# Patient Record
Sex: FEMALE | Race: WHITE | NOT HISPANIC OR LATINO | Employment: OTHER | URBAN - METROPOLITAN AREA
[De-identification: names, ages, dates, MRNs, and addresses within clinical notes are randomized per-mention and may not be internally consistent; named-entity substitution may affect disease eponyms.]

---

## 2017-01-18 ENCOUNTER — OFFICE VISIT (OUTPATIENT)
Dept: PAIN MEDICINE | Facility: CLINIC | Age: 52
End: 2017-01-18
Payer: MEDICARE

## 2017-01-18 VITALS
BODY MASS INDEX: 22.2 KG/M2 | HEART RATE: 70 BPM | DIASTOLIC BLOOD PRESSURE: 78 MMHG | HEIGHT: 64 IN | SYSTOLIC BLOOD PRESSURE: 121 MMHG | TEMPERATURE: 98 F | WEIGHT: 130.06 LBS

## 2017-01-18 DIAGNOSIS — M79.10 MYALGIA: ICD-10-CM

## 2017-01-18 DIAGNOSIS — M47.816 SPONDYLOSIS OF LUMBAR REGION WITHOUT MYELOPATHY OR RADICULOPATHY: ICD-10-CM

## 2017-01-18 DIAGNOSIS — M48.02 NEURAL FORAMINAL STENOSIS OF CERVICAL SPINE: ICD-10-CM

## 2017-01-18 DIAGNOSIS — M47.816 LUMBAR FACET ARTHROPATHY: ICD-10-CM

## 2017-01-18 DIAGNOSIS — M47.812 FACET ARTHRITIS OF CERVICAL REGION: ICD-10-CM

## 2017-01-18 DIAGNOSIS — M47.812 FACET JOINT DISEASE OF CERVICAL REGION: Primary | ICD-10-CM

## 2017-01-18 PROCEDURE — 99213 OFFICE O/P EST LOW 20 MIN: CPT | Mod: S$PBB,,, | Performed by: NURSE PRACTITIONER

## 2017-01-18 PROCEDURE — 99213 OFFICE O/P EST LOW 20 MIN: CPT | Mod: PBBFAC | Performed by: NURSE PRACTITIONER

## 2017-01-18 PROCEDURE — 99999 PR PBB SHADOW E&M-EST. PATIENT-LVL III: CPT | Mod: PBBFAC,,, | Performed by: NURSE PRACTITIONER

## 2017-01-18 NOTE — MR AVS SNAPSHOT
[facility]  [address]  [place] [address]  Phone: [phone]  Fax: [phone]                  [place]   2017 8:20 AM   Office Visit    Description:  Female : 1965   Provider:  RADHA Salcido   Department:  [facility]           Reason for Visit     Headache     Shoulder Pain           Diagnoses this Visit        Comments    Facet joint disease of cervical region    -  Primary     Facet arthritis of cervical region         Myalgia         Neural foraminal stenosis of cervical spine         Lumbar facet arthropathy         Spondylosis of lumbar region without myelopathy or radiculopathy                To Do List           Your Future Surgeries/Procedures     2017   Surgery with Richmond Zafar MD   [facility] ([facility])    [address] [facility] [address] [phone]              Goals (5 Years of Data)     None      [facility]     [facility] -  Assistance  Registered nurses in the [facility] provide clinical advisement, health education, appointment booking, and other advisory services.  Call for this free service at [phone].             Medications           Message regarding Medications     Verify the changes and/or additions to your medication regime listed below are the same as discussed with your clinician today.  If any of these changes or additions are incorrect, please notify your healthcare provider.             Verify that the below list of medications is an accurate representation of the medications you are currently taking.  If none reported, the list may be blank. If incorrect, please contact your healthcare provider. Carry this list with you in case of emergency.           Current Medications     alprazolam (XANAX) 1 MG tablet     amitriptyline (ELAVIL) 10 MG tablet Take 10 mg by mouth Daily.    calcium-vitamin D3  "500 mg(1,250mg) -200 unit per tablet Take 1 tablet by mouth 2 (two) times daily with meals.    CRESTOR 10 mg tablet     cyanocobalamin, vitamin B-12, (VITAMELTS ENERGY) 1,500 mcg TbDL Take by mouth.    cyclobenzaprine (FLEXERIL) 5 MG tablet Take 5 mg by mouth 3 (three) times daily as needed for Muscle spasms.    DOC-Q-LACE 100 mg capsule     fish,bora,flax oils-om3,6,9 #1 (TRIPLE OMEGA 3-6-9) 400-400-400 mg Cap Take by mouth.    infliximab (REMICADE) 100 mg injection Inject 100 mg into the vein.    Lacto gasseri-B bifid-B longum 1.5 billion cell Cap Take 1 capsule by mouth once daily. Lara colon health probiotic    lubiprostone (AMITIZA) 24 MCG Cap Take 24 mcg by mouth 2 (two) times daily.    mesalamine (APRISO) 0.375 gram Cp24 Take 0.375 g by mouth once daily.    mometasone (NASONEX) 50 mcg/actuation nasal spray SHAKE LQ AND U 2 SPRAYS IEN QD    multivitamin capsule Take 1 capsule by mouth once daily.    polyethylene glycol (GLYCOLAX) 17 gram/dose powder     PROAIR HFA 90 mcg/actuation inhaler     promethazine (PHENERGAN) 25 MG tablet     rabeprazole (ACIPHEX) 20 mg tablet TK 1 T PO QD    UBIDECARENONE (COENZYME Q10) 60 mg Cap Take 100 mg by mouth once daily.           Clinical Reference Information           Vital Signs - Last Recorded  Most recent update: 1/18/2017  8:51 AM by Chely Marcum LPN    BP Pulse Temp Ht Wt LMP    121/78 70 97.8 °F (36.6 °C) (Oral) 5' 4" (1.626 m) 59 kg (130 lb 1.1 oz) 07/11/2013    BMI                22.33 kg/m2          Blood Pressure          Most Recent Value    BP  121/78      Allergies as of 1/18/2017     Ciprofloxacin    Naproxen    Tramadol      Immunizations Administered on Date of Encounter - 1/18/2017     None      "
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
## 2017-02-01 ENCOUNTER — HOSPITAL ENCOUNTER (OUTPATIENT)
Facility: OTHER | Age: 52
Discharge: HOME | End: 2017-02-01
Attending: ANESTHESIOLOGY | Admitting: ANESTHESIOLOGY
Payer: MEDICARE

## 2017-02-01 ENCOUNTER — SURGERY (OUTPATIENT)
Age: 52
End: 2017-02-01

## 2017-02-01 VITALS
OXYGEN SATURATION: 99 % | DIASTOLIC BLOOD PRESSURE: 67 MMHG | TEMPERATURE: 98 F | SYSTOLIC BLOOD PRESSURE: 109 MMHG | BODY MASS INDEX: 21.68 KG/M2 | HEART RATE: 62 BPM | HEIGHT: 64 IN | WEIGHT: 127 LBS | RESPIRATION RATE: 16 BRPM

## 2017-02-01 DIAGNOSIS — M47.812 FACET ARTHRITIS OF CERVICAL REGION: Primary | ICD-10-CM

## 2017-02-01 PROCEDURE — 25000003 PHARM REV CODE 250: Performed by: ANESTHESIOLOGY

## 2017-02-01 PROCEDURE — 64492 INJ PARAVERT F JNT C/T 3 LEV: CPT | Mod: 50 | Performed by: ANESTHESIOLOGY

## 2017-02-01 PROCEDURE — 64490 INJ PARAVERT F JNT C/T 1 LEV: CPT | Mod: 50 | Performed by: ANESTHESIOLOGY

## 2017-02-01 PROCEDURE — 64492 INJ PARAVERT F JNT C/T 3 LEV: CPT | Mod: 50,,, | Performed by: ANESTHESIOLOGY

## 2017-02-01 PROCEDURE — 64491 INJ PARAVERT F JNT C/T 2 LEV: CPT | Mod: 50 | Performed by: ANESTHESIOLOGY

## 2017-02-01 PROCEDURE — 64490 INJ PARAVERT F JNT C/T 1 LEV: CPT | Mod: 50,,, | Performed by: ANESTHESIOLOGY

## 2017-02-01 PROCEDURE — 64491 INJ PARAVERT F JNT C/T 2 LEV: CPT | Mod: 50,,, | Performed by: ANESTHESIOLOGY

## 2017-02-01 RX ORDER — BUPIVACAINE HYDROCHLORIDE 5 MG/ML
3 INJECTION, SOLUTION PERINEURAL ONCE
Status: DISCONTINUED | OUTPATIENT
Start: 2017-02-01 | End: 2017-02-01 | Stop reason: HOSPADM

## 2017-02-01 RX ORDER — BUPIVACAINE HYDROCHLORIDE 5 MG/ML
INJECTION, SOLUTION EPIDURAL; INTRACAUDAL
Status: DISCONTINUED | OUTPATIENT
Start: 2017-02-01 | End: 2017-02-01 | Stop reason: HOSPADM

## 2017-02-01 RX ORDER — LIDOCAINE HYDROCHLORIDE 10 MG/ML
10 INJECTION INFILTRATION; PERINEURAL
Status: COMPLETED | OUTPATIENT
Start: 2017-02-01 | End: 2017-02-01

## 2017-02-01 RX ADMIN — BUPIVACAINE HYDROCHLORIDE 10 ML: 5 INJECTION, SOLUTION EPIDURAL; INTRACAUDAL; PERINEURAL at 02:02

## 2017-02-01 RX ADMIN — LIDOCAINE HYDROCHLORIDE 10 ML: 10 INJECTION, SOLUTION INFILTRATION; PERINEURAL at 02:02
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
## 2017-02-01 NOTE — OP NOTE
cERVICAL Medial Branch Block Under Fluoroscopy  Time-out taken to identify patient and procedure side prior to starting the procedure.        Date of Procedure: 02/01/2017                                                             PROCEDURE:  Bilateral medial branch block at the transverse processes of C3, C4, C5    REASON FOR PROCEDURE:  Facet arthropathy    PHYSICIAN: Richmond Zafar MD  ASSISTANTS: None    MEDICATIONS INJECTED:  0.5% Bupivicaine total 3mL  LOCAL ANESTHETIC USED:   Xylocaine 1% 1mL / site    SEDATION MEDICATIONS: None    ESTIMATED BLOOD LOSS:  None.    COMPLICATIONS:  None.    TECHNIQUE:   Laying in a prone position, the patient was prepped and draped in the usual sterile fashion using ChloraPrep and fenestrated drape.  The level was determined under fluoroscopic guidance.  Local anesthetic was given by going down to the hub of the 27-gauge 1.25in needle and raising a wheel.  A 22-gauge 3.5inch needle was introduced to the anatomic local of the medial branch at each of the above levels using fluoroscopy. Then after negative aspiration, the medication was injected slowly. The patient tolerated the procedure well.       The patient was monitored after the procedure.  Patient was given post procedure and discharge instructions to follow at home.  We will see the patient back in two weeks or the patient may call to inform of status. The patient was discharged in a stable condition

## 2017-02-01 NOTE — INTERVAL H&P NOTE
The patient has been examined and the H&P has been reviewed:    I concur with the findings and no changes have occurred since H&P was written.    Anesthesia/Surgery risks, benefits and alternative options discussed and understood by patient/family.      HPI  Presents today for cervical MBB @ C3,4,5 bilaterally. No new complaints or medical problems. Pt experiencing some congestion now related to allergies. No fevers, chills, or antibiotics.    PMHx, PSHx, Allergies, Medications reviewed in epic    ROS negative except pain complaints in HPI    OBJECTIVE:    Visit Vitals    LMP 07/11/2013       PHYSICAL EXAMINATION:    GENERAL: Well appearing, in no acute distress, alert and oriented x3.  PSYCH:  Mood and affect appropriate.  SKIN: Skin color, texture, turgor normal, no rashes or lesions.  CV: RRR with palpation of the radial artery.  PULM: No evidence of respiratory difficulty, symmetric chest rise. Clear to auscultation.  NEURO: Cranial nerves grossly intact.    Plan:    Proceed with procedure as planned    Donavan Darby  02/01/2017      There are no hospital problems to display for this patient.

## 2017-02-01 NOTE — DISCHARGE INSTRUCTIONS

## 2017-02-01 NOTE — DISCHARGE SUMMARY
Discharge Note  Short Stay      SUMMARY     Admit Date: 2/1/2017    Attending Physician: Richmond Zafar      Discharge Physician: Richmond Zafar      Discharge Date: 2/1/2017 3:03 PM     PROCEDURE:  Bilateral medial branch block at the transverse processes of C3, C4, C5    REASON FOR PROCEDURE:  Facet arthropathy    Disposition: Home or self care    Patient Instructions:   Current Discharge Medication List      CONTINUE these medications which have NOT CHANGED    Details   alprazolam (XANAX) 1 MG tablet Refills: 2      amitriptyline (ELAVIL) 10 MG tablet Take 10 mg by mouth Daily.  Refills: 2      calcium-vitamin D3 500 mg(1,250mg) -200 unit per tablet Take 1 tablet by mouth 2 (two) times daily with meals.      CRESTOR 10 mg tablet       cyanocobalamin, vitamin B-12, (VITAMELTS ENERGY) 1,500 mcg TbDL Take by mouth.      fish,bora,flax oils-om3,6,9 #1 (TRIPLE OMEGA 3-6-9) 400-400-400 mg Cap Take by mouth.      infliximab (REMICADE) 100 mg injection Inject 100 mg into the vein.      lubiprostone (AMITIZA) 24 MCG Cap Take 24 mcg by mouth 2 (two) times daily.      mesalamine (APRISO) 0.375 gram Cp24 Take 0.375 g by mouth once daily.      mometasone (NASONEX) 50 mcg/actuation nasal spray SHAKE LQ AND U 2 SPRAYS IEN QD  Refills: 2      multivitamin capsule Take 1 capsule by mouth once daily.      rabeprazole (ACIPHEX) 20 mg tablet TK 1 T PO QD  Refills: 11      UBIDECARENONE (COENZYME Q10) 60 mg Cap Take 100 mg by mouth once daily.      cyclobenzaprine (FLEXERIL) 5 MG tablet Take 5 mg by mouth 3 (three) times daily as needed for Muscle spasms.      DOC-Q-LACE 100 mg capsule Refills: 3      Lacto gasseri-B bifid-B longum 1.5 billion cell Cap Take 1 capsule by mouth once daily. Lara colon health probiotic      polyethylene glycol (GLYCOLAX) 17 gram/dose powder       PROAIR HFA 90 mcg/actuation inhaler       promethazine (PHENERGAN) 25 MG tablet Refills: 0             Resume home diet and activity

## 2017-02-01 NOTE — IP AVS SNAPSHOT
[facility] Location ([facility])  [address] [place], [place] [id number]  Phone: [phone]           Patient Discharge Instructions     Our goal is to set you up for success. This packet includes information on your condition, medications, and your home care. It will help you to care for yourself so you don't get sicker and need to go back to the hospital.     Please ask your nurse if you have any questions.        There are many details to remember when preparing to leave the hospital. Here is what you will need to do:    1. Take your medicine. If you are prescribed medications, review your Medication List in the following pages. You may have new medications to  at the pharmacy and others that you'll need to stop taking. Review the instructions for how and when to take your medications. Talk with your doctor or nurses if you are unsure of what to do.     2. Go to your follow-up appointments. Specific follow-up information is listed in the following pages. Your may be contacted by a transition nurse or clinical provider about future appointments. Be sure we have all of the phone numbers to reach you, if needed. Please contact your provider's office if you are unable to make an appointment.     3. Watch for warning signs. Your doctor or nurse will give you detailed warning signs to watch for and when to call for assistance. These instructions may also include educational information about your condition. If you experience any of warning signs to your health, call your doctor.               [facility]  Unless otherwise directed by your provider, please contact [facility], our nurse care line that is available for 24/7 assistance.     [phone] (toll-free)    Registered nurses in the [facility] provide clinical advisement, health education, appointment booking, and other advisory services.                    ** Verify the list of medication(s) below is accurate and up to  date. Carry this with you in case of emergency. If your medications have changed, please notify your healthcare provider.             Medication List      CONTINUE taking these medications        Additional Info                      alprazolam 1 MG tablet   Commonly known as:  XANAX   Refills:  2      Begin Date    AM    Noon    PM    Bedtime       amitriptyline 10 MG tablet   Commonly known as:  ELAVIL   Refills:  2   Dose:  10 mg    Instructions:  Take 10 mg by mouth Daily.     Begin Date    AM    Noon    PM    Bedtime       calcium-vitamin D3 500 mg(1,250mg) -200 unit per tablet   Refills:  0   Dose:  1 tablet    Instructions:  Take 1 tablet by mouth 2 (two) times daily with meals.     Begin Date    AM    Noon    PM    Bedtime       coenzyme Q10 60 mg Cap   Refills:  0   Dose:  100 mg    Instructions:  Take 100 mg by mouth once daily.     Begin Date    AM    Noon    PM    Bedtime       CRESTOR 10 MG tablet   Refills:  0   Generic drug:  rosuvastatin      Begin Date    AM    Noon    PM    Bedtime       cyclobenzaprine 5 MG tablet   Commonly known as:  FLEXERIL   Refills:  0   Dose:  5 mg    Instructions:  Take 5 mg by mouth 3 (three) times daily as needed for Muscle spasms.     Begin Date    AM    Noon    PM    Bedtime       DOC-Q-LACE 100 MG capsule   Refills:  3   Generic drug:  docusate sodium      Begin Date    AM    Noon    PM    Bedtime       infliximab 100 mg injection   Commonly known as:  REMICADE   Refills:  0   Dose:  100 mg    Instructions:  Inject 100 mg into the vein.     Begin Date    AM    Noon    PM    Bedtime       L. gasseri-B. bifidum-B longum 1.5 billion cell Cap   Refills:  0   Dose:  1 capsule    Instructions:  Take 1 capsule by mouth once daily. Lara colon health probiotic     Begin Date    AM    Noon    PM    Bedtime       lubiprostone 24 MCG Cap   Commonly known as:  AMITIZA   Refills:  0   Dose:  24 mcg    Instructions:  Take 24 mcg by mouth 2 (two) times daily.     Begin Date    AM     Noon    PM    Bedtime       mesalamine 0.375 gram Cp24   Commonly known as:  APRISO   Refills:  0   Dose:  0.375 g    Instructions:  Take 0.375 g by mouth once daily.     Begin Date    AM    Noon    PM    Bedtime       mometasone 50 mcg/actuation nasal spray   Commonly known as:  NASONEX   Refills:  2    Instructions:  SHAKE LQ AND U 2 SPRAYS IEN QD     Begin Date    AM    Noon    PM    Bedtime       multivitamin capsule   Refills:  0   Dose:  1 capsule    Instructions:  Take 1 capsule by mouth once daily.     Begin Date    AM    Noon    PM    Bedtime       polyethylene glycol 17 gram/dose powder   Commonly known as:  GLYCOLAX   Refills:  0      Begin Date    AM    Noon    PM    Bedtime       PROAIR HFA 90 mcg/actuation inhaler   Refills:  0   Generic drug:  albuterol      Begin Date    AM    Noon    PM    Bedtime       promethazine 25 MG tablet   Commonly known as:  PHENERGAN   Refills:  0      Begin Date    AM    Noon    PM    Bedtime       rabeprazole 20 mg tablet   Commonly known as:  ACIPHEX   Refills:  11    Instructions:  TK 1 T PO QD     Begin Date    AM    Noon    PM    Bedtime       TRIPLE OMEGA 3-6-9 400-400-400 mg Cap   Refills:  0   Generic drug:  fish,bora,flax oils-om3,6,9 #1    Instructions:  Take by mouth.     Begin Date    AM    Noon    PM    Bedtime       VITAMELTS ENERGY 1,500 mcg Tbdl   Refills:  0   Generic drug:  cyanocobalamin (vitamin B-12)    Instructions:  Take by mouth.     Begin Date    AM    Noon    PM    Bedtime                  Please bring to all follow up appointments:    1. A copy of your discharge instructions.  2. All medicines you are currently taking in their original bottles.  3. Identification and insurance card.    Please arrive 15 minutes ahead of scheduled appointment time.    Please call 24 hours in advance if you must reschedule your appointment and/or time.        Your Scheduled Appointments     Apr 25, 2017  9:00 AM CDT   OCT with OPHTHALMOLOGY TESTING, RYAN  "  JITENDRA Narvaez - Ophthalmology ([facility]    [address] [facility] [address]   [phone]            Apr 25, 2017 10:00 AM CDT   Established Patient Visit with OPHTHALMOLOGY GENERALSOLANGEHIRA Solange - Ophthalmology ([facility]    [address] [facility] [address]   [phone]                  Discharge Instructions       Home Care Instructions Pain Management:    1. DIET:   You may resume your normal diet today.   2. BATHING:   You may shower with luke warm water.  3. DRESSING:   You may remove your bandage today.   4. ACTIVITY LEVEL:   You may resume your normal activities 24 hrs after your procedure.  5. MEDICATIONS:   You may resume your normal medications today.   6. SPECIAL INSTRUCTIONS:   No heat to the injection site for 24 hrs including, bath or shower, heating pad, moist heat, or hot tubs.    Use ice pack to injection site for any pain or discomfort.  Apply ice packs for 20 minute intervals as needed.   If you have received any sedatives by mouth today you may not drive for 12 hours.    If you have received any sedation through your IV, you may not drive for 24 hrs.     PLEASE CALL YOUR DOCTOR IF:  1. Redness or swelling around the injection site.  2. Fever of 101 degrees  3. Drainage (pus) from the injection site.  4. For any continuous bleeding (some dried blood over the incision is normal.)    FOR EMERGENCIES:   If any unusual problems or difficulties occur during clinic hours, call [phone] or 289.         Admission Information     Date & Time Provider Department [facility]    2/1/2017  1:29 PM Richmond Zafar MD [facility] [id number]      Care Providers     Provider Role Specialty Primary office phone    Richmond Zafar MD Attending Provider Pain Medicine [phone]    Richmond Zafar MD Surgeon  Pain Medicine [phone]      Your Vitals Were     BP Pulse Temp Resp Height Weight    109/67 62 98 °F (36.7 °C) (Oral) 16 5' 4" " (1.626 m) 57.6 kg (127 lb)    Last Period SpO2 BMI          07/11/2013 99% 21.8 kg/m2        Recent Lab Values     No lab values to display.      Allergies as of 2/1/2017        Reactions    Ciprofloxacin Swelling    Naproxen Swelling    Tramadol     Makes pt have stomach bloat feeling      Advance Directives     An advance directive is a document which, in the event you are no longer able to make decisions for yourself, tells your healthcare team what kind of treatment you do or do not want to receive, or who you would like to make those decisions for you.  If you do not currently have an advance directive, [facility] encourages you to create one.  For more information call:  [phone]), [phone] ([phone]),  or log on to [email or web address].        Smoking Cessation     If you would like to quit smoking:   You may be eligible for free services if you are a [place] resident and started smoking cigarettes before September 1, 1988.  Call the [employer]oking [employer]essation [employer] ([employer]) toll free at (185) 775-0996 or (168) 095-0753.   Call 3-108-QUIT-NOW if you do not meet the above criteria.            Language Assistance Services     ATTENTION: Language assistance services are available, free of charge. Please call [phone].      ATENCIÓN: Si habla español, tiene a vieyra disposición servicios gratuitos de asistencia lingüística. Llame al [phone].     CHÚ Ý: N?u b?n nói Ti?ng Vi?t, có các d?ch v? h? tr? ngôn ng? mi?n phí dành cho b?n. G?i s? [phone].         [facility] complies with applicable Federal civil rights laws and does not discriminate on the basis of race, color, national origin, age, disability, or sex.

## 2017-02-02 ENCOUNTER — TELEPHONE (OUTPATIENT)
Dept: PAIN MEDICINE | Facility: CLINIC | Age: 52
End: 2017-02-02

## 2017-02-02 NOTE — TELEPHONE ENCOUNTER
----- Message from Matthew Graves sent at 2/2/2017  2:22 PM CST -----  X_  1st Request  _  2nd Request  _  3rd Request        Who: Elba Rock    Why: Patient giving results of her pain diary. An hour after procedure- 95 percent relief with pressure, 5 pm yesterday- 90 percent relief with pressure, 6 pm yesterday- 75 percent relief with pressure on shoulder and shoulder blade, 7 pm yesterday- 73 percent with pressure, 8 pm- 70 percent relief, 9 pm- 65 percent relief, 8:30 am today- 75 percent. Currently- 65 percent    What Number to Call Back: [phone]    When to Expect a call back: (Before the end of the day)   -- if the call is after 12:00, the call back will be tomorrow.

## 2017-02-22 ENCOUNTER — HOSPITAL ENCOUNTER (OUTPATIENT)
Facility: OTHER | Age: 52
Discharge: HOME | End: 2017-02-22
Attending: ANESTHESIOLOGY | Admitting: ANESTHESIOLOGY
Payer: MEDICARE

## 2017-02-22 ENCOUNTER — SURGERY (OUTPATIENT)
Age: 52
End: 2017-02-22

## 2017-02-22 VITALS
TEMPERATURE: 98 F | HEART RATE: 69 BPM | OXYGEN SATURATION: 100 % | BODY MASS INDEX: 21.34 KG/M2 | DIASTOLIC BLOOD PRESSURE: 71 MMHG | RESPIRATION RATE: 18 BRPM | HEIGHT: 64 IN | WEIGHT: 125 LBS | SYSTOLIC BLOOD PRESSURE: 127 MMHG

## 2017-02-22 DIAGNOSIS — M47.812 FACET ARTHRITIS OF CERVICAL REGION: Primary | ICD-10-CM

## 2017-02-22 PROCEDURE — 64633 DESTROY CERV/THOR FACET JNT: CPT | Performed by: ANESTHESIOLOGY

## 2017-02-22 PROCEDURE — 64634 DESTROY C/TH FACET JNT ADDL: CPT | Mod: LT,,, | Performed by: ANESTHESIOLOGY

## 2017-02-22 PROCEDURE — 25000003 PHARM REV CODE 250: Performed by: ANESTHESIOLOGY

## 2017-02-22 PROCEDURE — 99152 MOD SED SAME PHYS/QHP 5/>YRS: CPT | Mod: ,,, | Performed by: ANESTHESIOLOGY

## 2017-02-22 PROCEDURE — 63600175 PHARM REV CODE 636 W HCPCS: Performed by: ANESTHESIOLOGY

## 2017-02-22 PROCEDURE — 64634 DESTROY C/TH FACET JNT ADDL: CPT | Performed by: ANESTHESIOLOGY

## 2017-02-22 PROCEDURE — 64633 DESTROY CERV/THOR FACET JNT: CPT | Mod: LT,,, | Performed by: ANESTHESIOLOGY

## 2017-02-22 RX ORDER — FENTANYL CITRATE 50 UG/ML
INJECTION, SOLUTION INTRAMUSCULAR; INTRAVENOUS
Status: DISCONTINUED | OUTPATIENT
Start: 2017-02-22 | End: 2017-02-22 | Stop reason: HOSPADM

## 2017-02-22 RX ORDER — MIDAZOLAM HYDROCHLORIDE 1 MG/ML
INJECTION INTRAMUSCULAR; INTRAVENOUS
Status: DISCONTINUED | OUTPATIENT
Start: 2017-02-22 | End: 2017-02-22 | Stop reason: HOSPADM

## 2017-02-22 RX ORDER — SODIUM CHLORIDE 9 MG/ML
INJECTION, SOLUTION INTRAVENOUS CONTINUOUS
Status: DISCONTINUED | OUTPATIENT
Start: 2017-02-22 | End: 2017-02-22 | Stop reason: HOSPADM

## 2017-02-22 RX ORDER — FENTANYL CITRATE 50 UG/ML
100 INJECTION, SOLUTION INTRAMUSCULAR; INTRAVENOUS ONCE
Status: DISCONTINUED | OUTPATIENT
Start: 2017-02-22 | End: 2017-02-22 | Stop reason: HOSPADM

## 2017-02-22 RX ORDER — BUPIVACAINE HYDROCHLORIDE 2.5 MG/ML
INJECTION, SOLUTION EPIDURAL; INFILTRATION; INTRACAUDAL
Status: DISCONTINUED | OUTPATIENT
Start: 2017-02-22 | End: 2017-02-22 | Stop reason: HOSPADM

## 2017-02-22 RX ORDER — MIDAZOLAM HYDROCHLORIDE 1 MG/ML
2 INJECTION INTRAMUSCULAR; INTRAVENOUS
Status: DISCONTINUED | OUTPATIENT
Start: 2017-02-22 | End: 2017-02-22 | Stop reason: HOSPADM

## 2017-02-22 RX ORDER — LIDOCAINE HYDROCHLORIDE 10 MG/ML
10 INJECTION INFILTRATION; PERINEURAL
Status: COMPLETED | OUTPATIENT
Start: 2017-02-22 | End: 2017-02-22

## 2017-02-22 RX ORDER — BUPIVACAINE HYDROCHLORIDE 2.5 MG/ML
10 INJECTION, SOLUTION EPIDURAL; INFILTRATION; INTRACAUDAL ONCE
Status: DISCONTINUED | OUTPATIENT
Start: 2017-02-22 | End: 2017-02-22 | Stop reason: HOSPADM

## 2017-02-22 RX ADMIN — FENTANYL CITRATE 50 MCG: 50 INJECTION, SOLUTION INTRAMUSCULAR; INTRAVENOUS at 09:02

## 2017-02-22 RX ADMIN — LIDOCAINE HYDROCHLORIDE 10 ML: 10 INJECTION, SOLUTION INFILTRATION; PERINEURAL at 09:02

## 2017-02-22 RX ADMIN — MIDAZOLAM HYDROCHLORIDE 1 MG: 1 INJECTION, SOLUTION INTRAMUSCULAR; INTRAVENOUS at 09:02

## 2017-02-22 RX ADMIN — BUPIVACAINE HYDROCHLORIDE 10 ML: 2.5 INJECTION, SOLUTION EPIDURAL; INFILTRATION; INTRACAUDAL; PERINEURAL at 09:02

## 2017-02-22 RX ADMIN — SODIUM CHLORIDE: 0.9 INJECTION, SOLUTION INTRAVENOUS at 08:02

## 2017-02-22 NOTE — OP NOTE
Cervical Medial nerve branch block radiofrequency ablation Under Fluoroscopy     Time-out taken to identify patient and procedure side prior to starting the procedure.     02/22/2017    PROCEDURE: Left radiofrequency ablation of the the medial branch nerves at the   transverse process of C3, C4, C5     2)Conscious sedation provided by MD     REASON FOR PROCEDURE: Facet arthropathy     PHYSICIAN: Richmond Zafar MD     Assistants:   Eric Ordonez, PGY-5, Pain Fellow  I was present and supervising all critical portions of the procedure       MEDICATIONS INJECTED: 0.25% Bupivicaine total 3mL     LOCAL ANESTHETIC USED: Xylocaine 1% 1mL / site     ESTIMATED BLOOD LOSS: None.     COMPLICATIONS: None.     Interval history: Patient reports that he had complete relief of pain for the day of the procedure, we will proceed with the RFA     TECHNIQUE: Laying in a prone position, the patient was prepped and draped in the usual sterile fashion using ChloraPrep and fenestrated drape. The level was determined under fluoroscopic guidance. Local anesthetic was given by going down to the hub of the 27-gauge 1.25in needle and raising a wheel. A 18-gauge 10mm curved active tip needle was introduced to the anatomic local of the medial branch at each of the above levels using fluoroscopy. Then sensory and motor testing was performed to confirm that the needle tips were in the correct location. Then after negative aspiration, 1 mL of 0.25% bupivacaine was injected into each level. This was followed by thermal lesioning at 80 degrees celsius for 90 seconds.     Conscious sedation provided by M.D   The patient was monitored with continuous pulse oximetry, EKG, and intermittent blood pressure monitors. The patient was hemodynamically stable throughout the entire process was responsive to voice, and breathing spontaneously. Supplemental O2 was provided at 2L/min via nasal cannula. Patient was comfortable for the duration of the procedure.      There was a total of 2mg IV Midazolam and 50mcg Fentanyl titrated for the procedure    The patient tolerated the procedure well. Was able to move their leg at the knee and ankle at the conclusion of the procedure    The patient was monitored after the procedure. Patient was given post procedure and discharge instructions to follow at home. We will see the patient back in two weeks or the patient may call to inform of status. The patient was discharged in a stable condition
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
## 2017-02-22 NOTE — DISCHARGE SUMMARY
Discharge Note  Short Stay      SUMMARY     Admit Date: 2/22/2017    Attending Physician: Richmond Zafar      Discharge Physician: Richmond Zafar      Discharge Date: 2/22/2017 9:26 AM     PROCEDURE: Left radiofrequency ablation of the the medial branch nerves at the   transverse process of C3, C4, C5     2)Conscious sedation provided by MD     REASON FOR PROCEDURE: Facet arthropathy     Disposition: Home or self care    Patient Instructions:   Current Discharge Medication List      CONTINUE these medications which have NOT CHANGED    Details   alprazolam (XANAX) 1 MG tablet Refills: 2      amitriptyline (ELAVIL) 10 MG tablet Take 10 mg by mouth Daily.  Refills: 2      calcium-vitamin D3 500 mg(1,250mg) -200 unit per tablet Take 1 tablet by mouth 2 (two) times daily with meals.      CRESTOR 10 mg tablet       cyanocobalamin, vitamin B-12, (VITAMELTS ENERGY) 1,500 mcg TbDL Take by mouth.      fish,bora,flax oils-om3,6,9 #1 (TRIPLE OMEGA 3-6-9) 400-400-400 mg Cap Take by mouth.      infliximab (REMICADE) 100 mg injection Inject 100 mg into the vein.      Lacto gasseri-B bifid-B longum 1.5 billion cell Cap Take 1 capsule by mouth once daily. Lara colon health probiotic      lubiprostone (AMITIZA) 24 MCG Cap Take 24 mcg by mouth 2 (two) times daily.      mesalamine (APRISO) 0.375 gram Cp24 Take 0.375 g by mouth once daily.      mometasone (NASONEX) 50 mcg/actuation nasal spray SHAKE LQ AND U 2 SPRAYS IEN QD  Refills: 2      multivitamin capsule Take 1 capsule by mouth once daily.      polyethylene glycol (GLYCOLAX) 17 gram/dose powder       PROAIR HFA 90 mcg/actuation inhaler       rabeprazole (ACIPHEX) 20 mg tablet TK 1 T PO QD  Refills: 11      UBIDECARENONE (COENZYME Q10) 60 mg Cap Take 100 mg by mouth once daily.      cyclobenzaprine (FLEXERIL) 5 MG tablet Take 5 mg by mouth 3 (three) times daily as needed for Muscle spasms.      DOC-Q-LACE 100 mg capsule Refills: 3      promethazine (PHENERGAN) 25 MG tablet  Refills: 0             Resume home diet and activity

## 2017-02-22 NOTE — DISCHARGE INSTRUCTIONS

## 2017-02-22 NOTE — IP AVS SNAPSHOT
[facility] Location ([facility])  [address] [place], [place] [id number]  Phone: [phone]           Patient Discharge Instructions     Our goal is to set you up for success. This packet includes information on your condition, medications, and your home care. It will help you to care for yourself so you don't get sicker and need to go back to the hospital.     Please ask your nurse if you have any questions.        There are many details to remember when preparing to leave the hospital. Here is what you will need to do:    1. Take your medicine. If you are prescribed medications, review your Medication List in the following pages. You may have new medications to  at the pharmacy and others that you'll need to stop taking. Review the instructions for how and when to take your medications. Talk with your doctor or nurses if you are unsure of what to do.     2. Go to your follow-up appointments. Specific follow-up information is listed in the following pages. Your may be contacted by a transition nurse or clinical provider about future appointments. Be sure we have all of the phone numbers to reach you, if needed. Please contact your provider's office if you are unable to make an appointment.     3. Watch for warning signs. Your doctor or nurse will give you detailed warning signs to watch for and when to call for assistance. These instructions may also include educational information about your condition. If you experience any of warning signs to your health, call your doctor.               [facility]  Unless otherwise directed by your provider, please contact [facility], our nurse care line that is available for 24/7 assistance.     [phone] (toll-free)    Registered nurses in the [facility] provide clinical advisement, health education, appointment booking, and other advisory services.                    ** Verify the list of medication(s) below is accurate and up to  date. Carry this with you in case of emergency. If your medications have changed, please notify your healthcare provider.             Medication List      CONTINUE taking these medications        Additional Info                      alprazolam 1 MG tablet   Commonly known as:  XANAX   Refills:  2      Begin Date    AM    Noon    PM    Bedtime       amitriptyline 10 MG tablet   Commonly known as:  ELAVIL   Refills:  2   Dose:  10 mg    Instructions:  Take 10 mg by mouth Daily.     Begin Date    AM    Noon    PM    Bedtime       calcium-vitamin D3 500 mg(1,250mg) -200 unit per tablet   Refills:  0   Dose:  1 tablet    Instructions:  Take 1 tablet by mouth 2 (two) times daily with meals.     Begin Date    AM    Noon    PM    Bedtime       coenzyme Q10 60 mg Cap   Refills:  0   Dose:  100 mg    Instructions:  Take 100 mg by mouth once daily.     Begin Date    AM    Noon    PM    Bedtime       CRESTOR 10 MG tablet   Refills:  0   Generic drug:  rosuvastatin      Begin Date    AM    Noon    PM    Bedtime       cyclobenzaprine 5 MG tablet   Commonly known as:  FLEXERIL   Refills:  0   Dose:  5 mg    Instructions:  Take 5 mg by mouth 3 (three) times daily as needed for Muscle spasms.     Begin Date    AM    Noon    PM    Bedtime       DOC-Q-LACE 100 MG capsule   Refills:  3   Generic drug:  docusate sodium      Begin Date    AM    Noon    PM    Bedtime       infliximab 100 mg injection   Commonly known as:  REMICADE   Refills:  0   Dose:  100 mg    Instructions:  Inject 100 mg into the vein.     Begin Date    AM    Noon    PM    Bedtime       L. gasseri-B. bifidum-B longum 1.5 billion cell Cap   Refills:  0   Dose:  1 capsule    Instructions:  Take 1 capsule by mouth once daily. Lara colon health probiotic     Begin Date    AM    Noon    PM    Bedtime       lubiprostone 24 MCG Cap   Commonly known as:  AMITIZA   Refills:  0   Dose:  24 mcg    Instructions:  Take 24 mcg by mouth 2 (two) times daily.     Begin Date    AM     Noon    PM    Bedtime       mesalamine 0.375 gram Cp24   Commonly known as:  APRISO   Refills:  0   Dose:  0.375 g    Instructions:  Take 0.375 g by mouth once daily.     Begin Date    AM    Noon    PM    Bedtime       mometasone 50 mcg/actuation nasal spray   Commonly known as:  NASONEX   Refills:  2    Instructions:  SHAKE LQ AND U 2 SPRAYS IEN QD     Begin Date    AM    Noon    PM    Bedtime       multivitamin capsule   Refills:  0   Dose:  1 capsule    Instructions:  Take 1 capsule by mouth once daily.     Begin Date    AM    Noon    PM    Bedtime       polyethylene glycol 17 gram/dose powder   Commonly known as:  GLYCOLAX   Refills:  0      Begin Date    AM    Noon    PM    Bedtime       PROAIR HFA 90 mcg/actuation inhaler   Refills:  0   Generic drug:  albuterol      Begin Date    AM    Noon    PM    Bedtime       promethazine 25 MG tablet   Commonly known as:  PHENERGAN   Refills:  0      Begin Date    AM    Noon    PM    Bedtime       rabeprazole 20 mg tablet   Commonly known as:  ACIPHEX   Refills:  11    Instructions:  TK 1 T PO QD     Begin Date    AM    Noon    PM    Bedtime       TRIPLE OMEGA 3-6-9 400-400-400 mg Cap   Refills:  0   Generic drug:  fish,bora,flax oils-om3,6,9 #1    Instructions:  Take by mouth.     Begin Date    AM    Noon    PM    Bedtime       VITAMELTS ENERGY 1,500 mcg Tbdl   Refills:  0   Generic drug:  cyanocobalamin (vitamin B-12)    Instructions:  Take by mouth.     Begin Date    AM    Noon    PM    Bedtime                  Please bring to all follow up appointments:    1. A copy of your discharge instructions.  2. All medicines you are currently taking in their original bottles.  3. Identification and insurance card.    Please arrive 15 minutes ahead of scheduled appointment time.    Please call 24 hours in advance if you must reschedule your appointment and/or time.        Your Scheduled Appointments     Mar 30, 2017  9:30 AM CDT   Established Patient Visit with Richmond LUGO  MD [facility]   [facility] - Pain Management ([facility])    [address]  [place] [address]   [phone]            Apr 25, 2017  9:00 AM CDT   OCT with OPHTHALMOLOGY TESTING, RYAN Narvaez - Ophthalmology ([facility]    [address] [facility] [address]   [phone]            Apr 25, 2017 10:00 AM CDT   Established Patient Visit with OPHTHALMOLOGY GENERAL, RYAN Narvaez - Ophthalmology ([facility]    [address] [facility] [address]   [phone]              Your Future Surgeries/Procedures     Mar 08, 2017   Surgery with Richmond Zafar MD   [facility] ([facility])    [address] [facility] [id number] [phone]                  Discharge Instructions       Home Care Instructions Pain Management:    1. DIET:   You may resume your normal diet today.   2. BATHING:   You may shower with luke warm water. No soaking in tub.  3. DRESSING:   You may remove your bandage today.   4. ACTIVITY LEVEL:   You may resume your normal activities 24 hrs after your procedure.  5. MEDICATIONS:   You may resume your normal medications today.   6. SPECIAL INSTRUCTIONS:   No heat to the injection site for 24 hrs including, bath or shower, heating pad, moist heat, or hot tubs.    Use ice pack to injection site for any pain or discomfort.  Apply ice packs for 20 minute intervals as needed.   If you have received any sedatives by mouth today you may not drive for 12 hours.    If you have received any sedation through your IV, you may not drive for 24 hrs.     PLEASE CALL YOUR DOCTOR IF:  1. Redness or swelling around the injection site.  2. Fever of 101 degrees  3. Drainage (pus) from the injection site.  4. For any continuous bleeding (some dried blood over the incision is normal.)  5. For severe headache that is relieved when lying flat.    FOR EMERGENCIES:   If any unusual problems or difficulties occur during clinic hours,  "call [phone] or [phone].         Admission Information     Date & Time Provider Department CSN    2/22/2017  8:16 AM Richmond Zafar MD [facility] [id number]      Care Providers     Provider Role Specialty Primary office phone    Richmond Zafar MD Attending Provider Pain Medicine [phone]    Richmond Zafar MD Surgeon  Pain Medicine [phone]      Your Vitals Were     BP Pulse Temp Resp Height Weight    128/84 (BP Location: Left arm, Patient Position: Lying, BP Method: Automatic) 72 97.9 °F (36.6 °C) (Oral) 18 5' 4" (1.626 m) 56.7 kg (125 lb)    Last Period SpO2 BMI          07/11/2013 100% 21.46 kg/m2        Recent Lab Values     No lab values to display.      Allergies as of 2/22/2017        Reactions    Ciprofloxacin Swelling    Naproxen Swelling    Tramadol     Makes pt have stomach bloat feeling      Advance Directives     An advance directive is a document which, in the event you are no longer able to make decisions for yourself, tells your healthcare team what kind of treatment you do or do not want to receive, or who you would like to make those decisions for you.  If you do not currently have an advance directive, [facility] encourages you to create one.  For more information call:  [phone]), [phone] ([phone]),  or log on to [email or web address].        Smoking Cessation     If you would like to quit smoking:   You may be eligible for free services if you are a [place] resident and started smoking cigarettes before September 1, 1988.  Call the [employer]oking [employer]essation [employer] (SCT) toll free at (412) 501-7738 or (882) 637-4188.   Call 0-403-QUIT-NOW if you do not meet the above criteria.            Language Assistance Services     ATTENTION: Language assistance services are available, free of charge. Please call [phone].      ATENCIÓN: Si habla español, tiene a vieyra disposición servicios gratuitos de asistencia lingüística. Llame al " [phone].     DILLON Ý: N?u b?n nói Ti?ng Vi?t, có các d?ch v? h? tr? ngôn ng? mi?n phí dành cho b?n. G?i s? [phone].         [facility] complies with applicable Federal civil rights laws and does not discriminate on the basis of race, color, national origin, age, disability, or sex.

## 2017-02-23 ENCOUNTER — NURSE TRIAGE (OUTPATIENT)
Dept: ADMINISTRATIVE | Facility: CLINIC | Age: 52
End: 2017-02-23

## 2017-02-23 NOTE — TELEPHONE ENCOUNTER
Pt had a RFA procedure yesterday and was calling to notify her MD that she has a pressure in her head this morning- denies pain - states it's just pressure. Pt wants to know if this is normal and I am unable to answer this.    Please contact patient for further information or instructions    Reason for Disposition   Nursing judgment    Protocols used: ST NO PROTOCOL CALL: INFORMATION ONLY-A-[place]

## 2017-03-08 ENCOUNTER — HOSPITAL ENCOUNTER (OUTPATIENT)
Facility: OTHER | Age: 52
Discharge: HOME | End: 2017-03-08
Attending: ANESTHESIOLOGY | Admitting: ANESTHESIOLOGY
Payer: MEDICARE

## 2017-03-08 ENCOUNTER — SURGERY (OUTPATIENT)
Age: 52
End: 2017-03-08

## 2017-03-08 VITALS
HEART RATE: 70 BPM | BODY MASS INDEX: 21.79 KG/M2 | DIASTOLIC BLOOD PRESSURE: 65 MMHG | SYSTOLIC BLOOD PRESSURE: 121 MMHG | WEIGHT: 123 LBS | RESPIRATION RATE: 17 BRPM | TEMPERATURE: 98 F | OXYGEN SATURATION: 100 % | HEIGHT: 63 IN

## 2017-03-08 DIAGNOSIS — M47.812 FACET ARTHRITIS OF CERVICAL REGION: Primary | ICD-10-CM

## 2017-03-08 PROCEDURE — 64633 DESTROY CERV/THOR FACET JNT: CPT | Mod: RT,,, | Performed by: ANESTHESIOLOGY

## 2017-03-08 PROCEDURE — 64633 DESTROY CERV/THOR FACET JNT: CPT | Performed by: ANESTHESIOLOGY

## 2017-03-08 PROCEDURE — 63600175 PHARM REV CODE 636 W HCPCS: Performed by: ANESTHESIOLOGY

## 2017-03-08 PROCEDURE — 64634 DESTROY C/TH FACET JNT ADDL: CPT | Mod: RT,,, | Performed by: ANESTHESIOLOGY

## 2017-03-08 PROCEDURE — 99152 MOD SED SAME PHYS/QHP 5/>YRS: CPT | Mod: ,,, | Performed by: ANESTHESIOLOGY

## 2017-03-08 PROCEDURE — 25000003 PHARM REV CODE 250: Performed by: ANESTHESIOLOGY

## 2017-03-08 PROCEDURE — 64634 DESTROY C/TH FACET JNT ADDL: CPT | Performed by: ANESTHESIOLOGY

## 2017-03-08 RX ORDER — SODIUM CHLORIDE 9 MG/ML
INJECTION, SOLUTION INTRAVENOUS CONTINUOUS
Status: DISCONTINUED | OUTPATIENT
Start: 2017-03-08 | End: 2017-03-08 | Stop reason: HOSPADM

## 2017-03-08 RX ORDER — MIDAZOLAM HYDROCHLORIDE 1 MG/ML
INJECTION INTRAMUSCULAR; INTRAVENOUS
Status: DISCONTINUED | OUTPATIENT
Start: 2017-03-08 | End: 2017-03-08 | Stop reason: HOSPADM

## 2017-03-08 RX ORDER — VARENICLINE TARTRATE 0.5 (11)-1
KIT ORAL
COMMUNITY
End: 2018-05-29

## 2017-03-08 RX ORDER — MIDAZOLAM HYDROCHLORIDE 1 MG/ML
2 INJECTION INTRAMUSCULAR; INTRAVENOUS
Status: DISCONTINUED | OUTPATIENT
Start: 2017-03-08 | End: 2017-03-08 | Stop reason: HOSPADM

## 2017-03-08 RX ORDER — FENTANYL CITRATE 50 UG/ML
INJECTION, SOLUTION INTRAMUSCULAR; INTRAVENOUS
Status: DISCONTINUED | OUTPATIENT
Start: 2017-03-08 | End: 2017-03-08 | Stop reason: HOSPADM

## 2017-03-08 RX ORDER — BUPIVACAINE HYDROCHLORIDE 2.5 MG/ML
10 INJECTION, SOLUTION EPIDURAL; INFILTRATION; INTRACAUDAL ONCE
Status: COMPLETED | OUTPATIENT
Start: 2017-03-08 | End: 2017-03-08

## 2017-03-08 RX ORDER — LIDOCAINE HYDROCHLORIDE 10 MG/ML
10 INJECTION INFILTRATION; PERINEURAL
Status: COMPLETED | OUTPATIENT
Start: 2017-03-08 | End: 2017-03-08

## 2017-03-08 RX ORDER — FENTANYL CITRATE 50 UG/ML
100 INJECTION, SOLUTION INTRAMUSCULAR; INTRAVENOUS ONCE
Status: DISCONTINUED | OUTPATIENT
Start: 2017-03-08 | End: 2017-03-08 | Stop reason: HOSPADM

## 2017-03-08 RX ADMIN — MIDAZOLAM HYDROCHLORIDE 1 MG: 1 INJECTION, SOLUTION INTRAMUSCULAR; INTRAVENOUS at 11:03

## 2017-03-08 RX ADMIN — SODIUM CHLORIDE: 0.9 INJECTION, SOLUTION INTRAVENOUS at 10:03

## 2017-03-08 RX ADMIN — BUPIVACAINE HYDROCHLORIDE 10 ML: 2.5 INJECTION, SOLUTION EPIDURAL; INFILTRATION; INTRACAUDAL; PERINEURAL at 11:03

## 2017-03-08 RX ADMIN — LIDOCAINE HYDROCHLORIDE 10 ML: 10 INJECTION, SOLUTION INFILTRATION; PERINEURAL at 11:03

## 2017-03-08 RX ADMIN — FENTANYL CITRATE 25 MCG: 50 INJECTION, SOLUTION INTRAMUSCULAR; INTRAVENOUS at 11:03

## 2017-03-08 RX ADMIN — FENTANYL CITRATE 50 MCG: 50 INJECTION, SOLUTION INTRAMUSCULAR; INTRAVENOUS at 11:03

## 2017-03-08 NOTE — IP AVS SNAPSHOT
[facility] Location ([facility])  [address] [place], [place] [id number]  Phone: [phone]           Patient Discharge Instructions     Our goal is to set you up for success. This packet includes information on your condition, medications, and your home care. It will help you to care for yourself so you don't get sicker and need to go back to the hospital.     Please ask your nurse if you have any questions.        There are many details to remember when preparing to leave the hospital. Here is what you will need to do:    1. Take your medicine. If you are prescribed medications, review your Medication List in the following pages. You may have new medications to  at the pharmacy and others that you'll need to stop taking. Review the instructions for how and when to take your medications. Talk with your doctor or nurses if you are unsure of what to do.     2. Go to your follow-up appointments. Specific follow-up information is listed in the following pages. Your may be contacted by a transition nurse or clinical provider about future appointments. Be sure we have all of the phone numbers to reach you, if needed. Please contact your provider's office if you are unable to make an appointment.     3. Watch for warning signs. Your doctor or nurse will give you detailed warning signs to watch for and when to call for assistance. These instructions may also include educational information about your condition. If you experience any of warning signs to your health, call your doctor.               [facility]  Unless otherwise directed by your provider, please contact [facility], our nurse care line that is available for 24/7 assistance.     [phone] (toll-free)    Registered nurses in the [facility] provide clinical advisement, health education, appointment booking, and other advisory services.                    ** Verify the list of medication(s) below is accurate and up to  date. Carry this with you in case of emergency. If your medications have changed, please notify your healthcare provider.             Medication List      ASK your doctor about these medications        Additional Info                      alprazolam 1 MG tablet   Commonly known as:  XANAX   Refills:  2      Begin Date    AM    Noon    PM    Bedtime       amitriptyline 10 MG tablet   Commonly known as:  ELAVIL   Refills:  2   Dose:  10 mg    Instructions:  Take 10 mg by mouth Daily.     Begin Date    AM    Noon    PM    Bedtime       calcium-vitamin D3 500 mg(1,250mg) -200 unit per tablet   Refills:  0   Dose:  1 tablet    Instructions:  Take 1 tablet by mouth 2 (two) times daily with meals.     Begin Date    AM    Noon    PM    Bedtime       coenzyme Q10 60 mg Cap   Refills:  0   Dose:  100 mg    Instructions:  Take 100 mg by mouth once daily.     Begin Date    AM    Noon    PM    Bedtime       CRESTOR 10 MG tablet   Refills:  0   Generic drug:  rosuvastatin      Begin Date    AM    Noon    PM    Bedtime       cyclobenzaprine 5 MG tablet   Commonly known as:  FLEXERIL   Refills:  0   Dose:  5 mg    Instructions:  Take 5 mg by mouth 3 (three) times daily as needed for Muscle spasms.     Begin Date    AM    Noon    PM    Bedtime       DOC-Q-LACE 100 MG capsule   Refills:  3   Generic drug:  docusate sodium      Begin Date    AM    Noon    PM    Bedtime       infliximab 100 mg injection   Commonly known as:  REMICADE   Refills:  0   Dose:  100 mg    Instructions:  Inject 100 mg into the vein.     Begin Date    AM    Noon    PM    Bedtime       L. gasseri-B. bifidum-B longum 1.5 billion cell Cap   Refills:  0   Dose:  1 capsule    Instructions:  Take 1 capsule by mouth once daily. Lara colon health probiotic     Begin Date    AM    Noon    PM    Bedtime       lubiprostone 24 MCG Cap   Commonly known as:  AMITIZA   Refills:  0   Dose:  24 mcg    Instructions:  Take 24 mcg by mouth 2 (two) times daily.     Begin Date     AM    Noon    PM    Bedtime       mesalamine 0.375 gram Cp24   Commonly known as:  APRISO   Refills:  0   Dose:  0.375 g    Instructions:  Take 0.375 g by mouth once daily.     Begin Date    AM    Noon    PM    Bedtime       mometasone 50 mcg/actuation nasal spray   Commonly known as:  NASONEX   Refills:  2    Instructions:  SHAKE LQ AND U 2 SPRAYS IEN QD     Begin Date    AM    Noon    PM    Bedtime       multivitamin capsule   Refills:  0   Dose:  1 capsule    Instructions:  Take 1 capsule by mouth once daily.     Begin Date    AM    Noon    PM    Bedtime       polyethylene glycol 17 gram/dose powder   Commonly known as:  GLYCOLAX   Refills:  0      Begin Date    AM    Noon    PM    Bedtime       PROAIR HFA 90 mcg/actuation inhaler   Refills:  0   Generic drug:  albuterol      Begin Date    AM    Noon    PM    Bedtime       promethazine 25 MG tablet   Commonly known as:  PHENERGAN   Refills:  0      Begin Date    AM    Noon    PM    Bedtime       rabeprazole 20 mg tablet   Commonly known as:  ACIPHEX   Refills:  11    Instructions:  TK 1 T PO QD     Begin Date    AM    Noon    PM    Bedtime       ranitidine 150 MG tablet   Commonly known as:  ZANTAC   Refills:  0   Dose:  150 mg    Instructions:  Take 150 mg by mouth 2 (two) times daily.     Begin Date    AM    Noon    PM    Bedtime       TRIPLE OMEGA 3-6-9 400-400-400 mg Cap   Refills:  0   Generic drug:  fish,bora,flax oils-om3,6,9 #1    Instructions:  Take by mouth.     Begin Date    AM    Noon    PM    Bedtime       varenicline 0.5 mg (11)- 1 mg (42) tablet   Commonly known as:  CHANTIX KAJAL   Refills:  0    Instructions:  Take by mouth. Take one 0.5mg tab by mouth once daily X3 days,then increase to one 0.5mg tab twice daily X4 days,then increase to one 1mg tab twice daily     Begin Date    AM    Noon    PM    Bedtime       VITAMELTS ENERGY 1,500 mcg Tbdl   Refills:  0   Generic drug:  cyanocobalamin (vitamin B-12)    Instructions:  Take by mouth.     Begin  Date    AM    Noon    PM    Bedtime                  Please bring to all follow up appointments:    1. A copy of your discharge instructions.  2. All medicines you are currently taking in their original bottles.  3. Identification and insurance card.    Please arrive 15 minutes ahead of scheduled appointment time.    Please call 24 hours in advance if you must reschedule your appointment and/or time.        Your Scheduled Appointments     Mar 30, 2017  9:30 AM CDT   Established Patient Visit with Richmond Zafar MD   [facility] ([facility]    [address]  [facility] [address]   [phone]            Apr 25, 2017  9:00 AM CDT   OCT with OPHTHALMOLOGY TESTING, RYAN Narvaez - Ophthalmology ([facility])    [address] [facility] [address]   [phone]            Apr 25, 2017 10:00 AM CDT   Established Patient Visit with OPHTHALMOLOGY GENERAL, RYAN Narvaez - Ophthalmology ([facility])    [address] [facility] [address]   [phone]                  Discharge Instructions       Home Care Instructions Pain Management:    1. DIET:   You may resume your normal diet today.   2. BATHING:   You may shower with luke warm water. No soaking in tub.  3. DRESSING:   You may remove your bandage today.   4. ACTIVITY LEVEL:   You may resume your normal activities 24 hrs after your procedure.  5. MEDICATIONS:   You may resume your normal medications today.   6. SPECIAL INSTRUCTIONS:   No heat to the injection site for 24 hrs including, bath or shower, heating pad, moist heat, or hot tubs.    Use ice pack to injection site for any pain or discomfort.  Apply ice packs for 20 minute intervals as needed.   If you have received any sedatives by mouth today you may not drive for 12 hours.    If you have received any sedation through your IV, you may not drive for 24 hrs.     PLEASE CALL YOUR DOCTOR IF:  1. Redness or swelling around the injection  "site.  2. Fever of 101 degrees  3. Drainage (pus) from the injection site.  4. For any continuous bleeding (some dried blood over the incision is normal.)  5. For severe headache that is relieved when lying flat.    FOR EMERGENCIES:   If any unusual problems or difficulties occur during clinic hours, call [phone] or 146.         Admission Information     Date & Time Provider Department CSN    3/8/2017  9:58 AM Richmond Zafar MD [facility] [id number]      Care Providers     Provider Role Specialty Primary office phone    Richmond Zafar MD Attending Provider Pain Medicine [phone]    Richmond Zafar MD Surgeon  Pain Medicine [phone]      Your Vitals Were     BP Pulse Temp Resp Height Weight    119/72 78 97.6 °F (36.4 °C) (Oral) 18 5' 3" (1.6 m) 55.8 kg (123 lb)    Last Period SpO2 BMI          07/11/2013 100% 21.79 kg/m2        Recent Lab Values     No lab values to display.      Allergies as of 3/8/2017        Reactions    Ciprofloxacin Swelling    Naproxen Swelling    Tramadol     Makes pt have stomach bloat feeling      Advance Directives     An advance directive is a document which, in the event you are no longer able to make decisions for yourself, tells your healthcare team what kind of treatment you do or do not want to receive, or who you would like to make those decisions for you.  If you do not currently have an advance directive, [facility] encourages you to create one.  For more information call:  [phone]), [phone] ([phone]),  or log on to [email or web address].        Language Assistance Services     ATTENTION: Language assistance services are available, free of charge. Please call [phone].      ATENCIÓN: Si habla español, tiene a vieyra disposición servicios gratuitos de asistencia lingüística. Llame al [phone].     CHÚ Ý: N?u b?n nói Ti?ng Vi?t, có các d?ch v? h? tr? ngôn ng? mi?n phí dành cho b?n. G?i s? " [phone].         [facility] complies with applicable Federal civil rights laws and does not discriminate on the basis of race, color, national origin, age, disability, or sex.

## 2017-03-08 NOTE — OP NOTE
Cervical Medial nerve branch block radiofrequency ablation Under Fluoroscopy     Time-out taken to identify patient and procedure side prior to starting the procedure.     03/08/2017    PROCEDURE: Right radiofrequency ablation of the the medial branch nerves at the   transverse process of  C3, C4, C5    2)Conscious sedation provided by MD     REASON FOR PROCEDURE: Facet arthropathy     PHYSICIAN: Richmond Zafar MD     Assistants:   Raul Nogueira MD PGY-3  I was present and supervising all critical portions of the procedure     MEDICATIONS INJECTED: 0.25% Bupivicaine total 6 mL     LOCAL ANESTHETIC USED: Xylocaine 1% 1mL / site     ESTIMATED BLOOD LOSS: None.     COMPLICATIONS: None.     Interval history: Patient reports that he had complete relief of pain for the day of the procedure, we will proceed with the RFA     TECHNIQUE: Laying in a prone position, the patient was prepped and draped in the usual sterile fashion using ChloraPrep and fenestrated drape. The level was determined under fluoroscopic guidance. Local anesthetic was given by going down to the hub of the 27-gauge 1.25in needle and raising a wheel. A 18-gauge 10mm curved active tip needle was introduced to the anatomic local of the medial branch at each of the above levels using fluoroscopy. Then sensory and motor testing was performed to confirm that the needle tips were in the correct location. Then after negative aspiration, 1 mL of 0.25% bupivacaine was injected into each level. This was followed by thermal lesioning at 80 degrees celsius for 90 seconds.     Conscious sedation provided by M.D   The patient was monitored with continuous pulse oximetry, EKG, and intermittent blood pressure monitors. The patient was hemodynamically stable throughout the entire process was responsive to voice, and breathing spontaneously. Supplemental O2 was provided at 2L/min via nasal cannula. Patient was comfortable for the duration of the procedure.     There was  a total of 2mg IV Midazolam and 75 mcg Fentanyl titrated for the procedure    The patient tolerated the procedure well. Was able to move their leg at the knee and ankle at the conclusion of the procedure    The patient was monitored after the procedure. Patient was given post procedure and discharge instructions to follow at home. We will see the patient back in two weeks or the patient may call to inform of status. The patient was discharged in a stable condition

## 2017-03-08 NOTE — DISCHARGE SUMMARY
Discharge Note  Short Stay      SUMMARY     Admit Date: 3/8/2017    Attending Physician: Richmond Zafar      Discharge Physician: Richmond Zafar      Discharge Date: 3/8/2017 3:51 PM     PROCEDURE: Right radiofrequency ablation of the the medial branch nerves at the   transverse process of  C3, C4, C5    2)Conscious sedation provided by MD     REASON FOR PROCEDURE: Facet arthropathy     Disposition: Home or self care    Patient Instructions:   Discharge Medication List as of 3/8/2017 11:38 AM      CONTINUE these medications which have NOT CHANGED    Details   alprazolam (XANAX) 1 MG tablet Starting 12/29/2015, Until Discontinued, Historical Med      amitriptyline (ELAVIL) 10 MG tablet Take 10 mg by mouth Daily., Starting 4/1/2016, Until Discontinued, Historical Med      calcium-vitamin D3 500 mg(1,250mg) -200 unit per tablet Take 1 tablet by mouth 2 (two) times daily with meals., Until Discontinued, Historical Med      CRESTOR 10 mg tablet Starting 7/7/2014, Until Discontinued, Historical Med      cyanocobalamin, vitamin B-12, (VITAMELTS ENERGY) 1,500 mcg TbDL Take by mouth., Until Discontinued, Historical Med      cyclobenzaprine (FLEXERIL) 5 MG tablet Take 5 mg by mouth 3 (three) times daily as needed for Muscle spasms., Until Discontinued, Historical Med      DOC-Q-LACE 100 mg capsule Starting 10/30/2015, Until Discontinued, Historical Med      fish,bora,flax oils-om3,6,9 #1 (TRIPLE OMEGA 3-6-9) 400-400-400 mg Cap Take by mouth., Until Discontinued, Historical Med      infliximab (REMICADE) 100 mg injection Inject 100 mg into the vein., Until Discontinued, Historical Med      Lacto gasseri-B bifid-B longum 1.5 billion cell Cap Take 1 capsule by mouth once daily. Lara colon health probiotic, Until Discontinued, Historical Med      lubiprostone (AMITIZA) 24 MCG Cap Take 24 mcg by mouth 2 (two) times daily., Until Discontinued, Historical Med      mesalamine (APRISO) 0.375 gram Cp24 Take 0.375 g by mouth  once daily., Until Discontinued, Historical Med      mometasone (NASONEX) 50 mcg/actuation nasal spray SHAKE LQ AND U 2 SPRAYS IEN QD, Historical Med      multivitamin capsule Take 1 capsule by mouth once daily., Until Discontinued, Historical Med      polyethylene glycol (GLYCOLAX) 17 gram/dose powder Starting 5/27/2013, Until Discontinued, Historical Med      PROAIR HFA 90 mcg/actuation inhaler Starting 4/7/2015, Until Discontinued, Historical Med      promethazine (PHENERGAN) 25 MG tablet Starting 6/24/2014, Until Discontinued, Historical Med      rabeprazole (ACIPHEX) 20 mg tablet TK 1 T PO QD, Historical Med      ranitidine (ZANTAC) 150 MG tablet Take 150 mg by mouth 2 (two) times daily., Until Discontinued, Historical Med      UBIDECARENONE (COENZYME Q10) 60 mg Cap Take 100 mg by mouth once daily., Until Discontinued, Historical Med      varenicline (CHANTIX KAJAL) 0.5 mg (11)- 1 mg (42) tablet Take by mouth. Take one 0.5mg tab by mouth once daily X3 days,then increase to one 0.5mg tab twice daily X4 days,then increase to one 1mg tab twice daily, Until Discontinued, Historical Med             Resume home diet and activity
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
## 2017-03-08 NOTE — DISCHARGE INSTRUCTIONS

## 2017-03-30 ENCOUNTER — OFFICE VISIT (OUTPATIENT)
Dept: PAIN MEDICINE | Facility: CLINIC | Age: 52
End: 2017-03-30
Attending: ANESTHESIOLOGY
Payer: MEDICARE

## 2017-03-30 VITALS
SYSTOLIC BLOOD PRESSURE: 109 MMHG | DIASTOLIC BLOOD PRESSURE: 67 MMHG | HEART RATE: 62 BPM | TEMPERATURE: 98 F | HEIGHT: 63 IN | WEIGHT: 121.25 LBS | BODY MASS INDEX: 21.48 KG/M2 | RESPIRATION RATE: 18 BRPM

## 2017-03-30 DIAGNOSIS — M62.838 CERVICAL PARASPINAL MUSCLE SPASM: ICD-10-CM

## 2017-03-30 DIAGNOSIS — M47.812 FACET ARTHRITIS OF CERVICAL REGION: Primary | ICD-10-CM

## 2017-03-30 DIAGNOSIS — M48.02 NEURAL FORAMINAL STENOSIS OF CERVICAL SPINE: ICD-10-CM

## 2017-03-30 PROCEDURE — 99999 PR PBB SHADOW E&M-EST. PATIENT-LVL III: CPT | Mod: PBBFAC,,, | Performed by: NURSE PRACTITIONER

## 2017-03-30 PROCEDURE — 99213 OFFICE O/P EST LOW 20 MIN: CPT | Mod: PBBFAC | Performed by: NURSE PRACTITIONER

## 2017-03-30 PROCEDURE — 99213 OFFICE O/P EST LOW 20 MIN: CPT | Mod: S$PBB,,, | Performed by: NURSE PRACTITIONER

## 2017-03-30 RX ORDER — TIZANIDINE 4 MG/1
4 TABLET ORAL 3 TIMES DAILY PRN
Qty: 90 TABLET | Refills: 0 | Status: SHIPPED | OUTPATIENT
Start: 2017-03-30 | End: 2017-04-29
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
## 2017-03-30 NOTE — MR AVS SNAPSHOT
[facility]  [address]  [facility] [address]  Phone: [phone]  Fax: [phone]                  [place]   3/30/2017 10:00 AM   Office Visit    Description:  Female : 1965   Provider:  Sharee Serrano NP   Department:  [facility]           Reason for Visit     Neck Pain           Diagnoses this Visit        Comments    Facet arthritis of cervical region    -  Primary     Neural foraminal stenosis of cervical spine         Cervical paraspinal muscle spasm                To Do List           Future Appointments        Provider Department Dept Phone    2017 11:00 AM Sharee Serrano NP [facility] [phone]      Goals (5 Years of Data)     None       These Medications        Disp Refills Start End    tizanidine (ZANAFLEX) 4 MG tablet 90 tablet 0 3/30/2017 2017    Take 1 tablet (4 mg total) by mouth 3 (three) times daily as needed. - Oral    Pharmacy: [facility] [address] [place] [address] [facility] AT [facility] Ph #: [phone]         [facility]     [facility] On Call Nurse Care Line -  Assistance  Unless otherwise directed by your provider, please contact [facility], our nurse care line that is available for  assistance.     Registered nurses in the [facility] provide: appointment scheduling, clinical advisement, health education, and other advisory services.  Call: [phone] (toll free)               Medications           Message regarding Medications     Verify the changes and/or additions to your medication regime listed below are the same as discussed with your clinician today.  If any of these changes or additions are incorrect, please notify your healthcare provider.        START taking these NEW medications        Refills    tizanidine (ZANAFLEX) 4 MG tablet 0    Sig: Take 1 tablet (4 mg total) by mouth 3 (three) times daily as needed.    Class:  Normal    Route: Oral           Verify that the below list of medications is an accurate representation of the medications you are currently taking.  If none reported, the list may be blank. If incorrect, please contact your healthcare provider. Carry this list with you in case of emergency.           Current Medications     alprazolam (XANAX) 1 MG tablet     amitriptyline (ELAVIL) 10 MG tablet Take 10 mg by mouth Daily.    calcium-vitamin D3 500 mg(1,250mg) -200 unit per tablet Take 1 tablet by mouth 2 (two) times daily with meals.    CRESTOR 10 mg tablet     cyanocobalamin, vitamin B-12, (VITAMELTS ENERGY) 1,500 mcg TbDL Take by mouth.    cyclobenzaprine (FLEXERIL) 5 MG tablet Take 5 mg by mouth 3 (three) times daily as needed for Muscle spasms.    DOC-Q-LACE 100 mg capsule     fish,bora,flax oils-om3,6,9 #1 (TRIPLE OMEGA 3-6-9) 400-400-400 mg Cap Take by mouth.    infliximab (REMICADE) 100 mg injection Inject 100 mg into the vein.    Lacto gasseri-B bifid-B longum 1.5 billion cell Cap Take 1 capsule by mouth once daily. Lara colon health probiotic    lubiprostone (AMITIZA) 24 MCG Cap Take 24 mcg by mouth 2 (two) times daily.    mesalamine (APRISO) 0.375 gram Cp24 Take 0.375 g by mouth once daily.    mometasone (NASONEX) 50 mcg/actuation nasal spray SHAKE LQ AND U 2 SPRAYS IEN QD    multivitamin capsule Take 1 capsule by mouth once daily.    polyethylene glycol (GLYCOLAX) 17 gram/dose powder     PROAIR HFA 90 mcg/actuation inhaler     promethazine (PHENERGAN) 25 MG tablet     rabeprazole (ACIPHEX) 20 mg tablet TK 1 T PO QD    ranitidine (ZANTAC) 150 MG tablet Take 150 mg by mouth 2 (two) times daily.    tizanidine (ZANAFLEX) 4 MG tablet Take 1 tablet (4 mg total) by mouth 3 (three) times daily as needed.    UBIDECARENONE (COENZYME Q10) 60 mg Cap Take 100 mg by mouth once daily.    varenicline (CHANTIX KAJAL) 0.5 mg (11)- 1 mg (42) tablet Take by mouth. Take one 0.5mg tab by mouth once daily X3 days,then increase  "to one 0.5mg tab twice daily X4 days,then increase to one 1mg tab twice daily           Clinical Reference Information           Your Vitals Were     BP Pulse Temp Resp Height Weight    109/67 (BP Location: Right arm, Patient Position: Sitting) 62 98.2 °F (36.8 °C) (Oral) 18 5' 3" (1.6 m) 55 kg (121 lb 4.1 oz)    Last Period BMI             07/11/2013 21.48 kg/m2         Blood Pressure          Most Recent Value    BP  109/67      Allergies as of 3/30/2017     Ciprofloxacin    Naproxen    Tramadol      Immunizations Administered on Date of Encounter - 3/30/2017     None      Language Assistance Services     ATTENTION: Language assistance services are available, free of charge. Please call [phone].      ATENCIÓN: Si leónla jl, tiene a vieyra disposición servicios gratuitos de asistencia lingüística. Llame al [phone].     CHÚ Ý: N?u b?n nói Ti?ng Vi?t, có các d?ch v? h? tr? ngôn ng? mi?n phí dành cho b?n. G?i s? [phone].         [facility] complies with applicable Federal civil rights laws and does not discriminate on the basis of race, color, national origin, age, disability, or sex.        "

## 2017-06-30 ENCOUNTER — OFFICE VISIT (OUTPATIENT)
Dept: PAIN MEDICINE | Facility: CLINIC | Age: 52
End: 2017-06-30
Payer: MEDICARE

## 2017-06-30 VITALS
BODY MASS INDEX: 21.5 KG/M2 | SYSTOLIC BLOOD PRESSURE: 108 MMHG | HEART RATE: 73 BPM | WEIGHT: 121.31 LBS | TEMPERATURE: 98 F | HEIGHT: 63 IN | DIASTOLIC BLOOD PRESSURE: 66 MMHG

## 2017-06-30 DIAGNOSIS — M51.36 DDD (DEGENERATIVE DISC DISEASE), LUMBAR: ICD-10-CM

## 2017-06-30 DIAGNOSIS — M53.3 SACROILIAC JOINT PAIN: ICD-10-CM

## 2017-06-30 DIAGNOSIS — M79.18 MYOFASCIAL PAIN: ICD-10-CM

## 2017-06-30 DIAGNOSIS — M47.816 FACET ARTHRITIS OF LUMBAR REGION: Primary | ICD-10-CM

## 2017-06-30 DIAGNOSIS — M54.12 CERVICAL RADICULOPATHY: ICD-10-CM

## 2017-06-30 PROCEDURE — 99213 OFFICE O/P EST LOW 20 MIN: CPT | Mod: S$PBB,,, | Performed by: NURSE PRACTITIONER

## 2017-06-30 PROCEDURE — 99999 PR PBB SHADOW E&M-EST. PATIENT-LVL III: CPT | Mod: PBBFAC,,, | Performed by: NURSE PRACTITIONER

## 2017-06-30 PROCEDURE — 99213 OFFICE O/P EST LOW 20 MIN: CPT | Mod: PBBFAC | Performed by: NURSE PRACTITIONER

## 2017-06-30 RX ORDER — CYCLOBENZAPRINE HCL 10 MG
10 TABLET ORAL 3 TIMES DAILY PRN
Qty: 90 TABLET | Refills: 1 | Status: SHIPPED | OUTPATIENT
Start: 2017-06-30 | End: 2017-07-30

## 2017-06-30 NOTE — PROGRESS NOTES
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 relief. We can repeat this should her pain returns.     - She has also had relief with cervical HOWARD in the past. We can repeat this in the future.     - Trial Flexeril 10 mg TID PRN.     - The patient will continue a home exercise routine to help with pain and strengthening.      - RTC 2 weeks after above procedure.     - Dr. Zafar was consulted on the patient and agrees with this plan.    The above plan and management options were discussed at length with patient. Patient is in agreement with the above and verbalized understanding.     Sharee Serrano NP  06/30/2017

## 2017-07-12 ENCOUNTER — HOSPITAL ENCOUNTER (OUTPATIENT)
Facility: OTHER | Age: 52
Discharge: HOME | End: 2017-07-12
Attending: ANESTHESIOLOGY | Admitting: ANESTHESIOLOGY
Payer: MEDICARE

## 2017-07-12 ENCOUNTER — SURGERY (OUTPATIENT)
Age: 52
End: 2017-07-12

## 2017-07-12 VITALS
BODY MASS INDEX: 20.83 KG/M2 | WEIGHT: 122 LBS | SYSTOLIC BLOOD PRESSURE: 105 MMHG | RESPIRATION RATE: 18 BRPM | OXYGEN SATURATION: 98 % | DIASTOLIC BLOOD PRESSURE: 60 MMHG | HEIGHT: 64 IN | TEMPERATURE: 98 F | HEART RATE: 64 BPM

## 2017-07-12 DIAGNOSIS — M47.899 FACET SYNDROME: Primary | ICD-10-CM

## 2017-07-12 PROCEDURE — 64636 DESTROY L/S FACET JNT ADDL: CPT | Mod: ,,, | Performed by: ANESTHESIOLOGY

## 2017-07-12 PROCEDURE — 63600175 PHARM REV CODE 636 W HCPCS: Performed by: ANESTHESIOLOGY

## 2017-07-12 PROCEDURE — 64636 DESTROY L/S FACET JNT ADDL: CPT | Performed by: ANESTHESIOLOGY

## 2017-07-12 PROCEDURE — 64635 DESTROY LUMB/SAC FACET JNT: CPT | Performed by: ANESTHESIOLOGY

## 2017-07-12 PROCEDURE — 25000003 PHARM REV CODE 250: Performed by: ANESTHESIOLOGY

## 2017-07-12 PROCEDURE — 64635 DESTROY LUMB/SAC FACET JNT: CPT | Mod: ,,, | Performed by: ANESTHESIOLOGY

## 2017-07-12 RX ORDER — LIDOCAINE HYDROCHLORIDE 10 MG/ML
INJECTION INFILTRATION; PERINEURAL
Status: DISCONTINUED | OUTPATIENT
Start: 2017-07-12 | End: 2017-07-12 | Stop reason: HOSPADM

## 2017-07-12 RX ORDER — FENTANYL CITRATE 50 UG/ML
INJECTION, SOLUTION INTRAMUSCULAR; INTRAVENOUS
Status: DISCONTINUED | OUTPATIENT
Start: 2017-07-12 | End: 2017-07-12 | Stop reason: HOSPADM

## 2017-07-12 RX ORDER — BUPIVACAINE HYDROCHLORIDE 2.5 MG/ML
INJECTION, SOLUTION EPIDURAL; INFILTRATION; INTRACAUDAL
Status: DISCONTINUED | OUTPATIENT
Start: 2017-07-12 | End: 2017-07-12 | Stop reason: HOSPADM

## 2017-07-12 RX ORDER — MIDAZOLAM HYDROCHLORIDE 1 MG/ML
INJECTION INTRAMUSCULAR; INTRAVENOUS
Status: DISCONTINUED | OUTPATIENT
Start: 2017-07-12 | End: 2017-07-12 | Stop reason: HOSPADM

## 2017-07-12 RX ORDER — SODIUM CHLORIDE 9 MG/ML
INJECTION, SOLUTION INTRAVENOUS CONTINUOUS
Status: DISCONTINUED | OUTPATIENT
Start: 2017-07-12 | End: 2017-07-12 | Stop reason: HOSPADM

## 2017-07-12 RX ADMIN — MIDAZOLAM HYDROCHLORIDE 1 MG: 1 INJECTION, SOLUTION INTRAMUSCULAR; INTRAVENOUS at 09:07

## 2017-07-12 RX ADMIN — FENTANYL CITRATE 50 MCG: 50 INJECTION, SOLUTION INTRAMUSCULAR; INTRAVENOUS at 09:07

## 2017-07-12 RX ADMIN — LIDOCAINE HYDROCHLORIDE 10 ML: 10 INJECTION, SOLUTION INFILTRATION; PERINEURAL at 09:07

## 2017-07-12 RX ADMIN — SODIUM CHLORIDE: 0.9 INJECTION, SOLUTION INTRAVENOUS at 08:07

## 2017-07-12 RX ADMIN — BUPIVACAINE HYDROCHLORIDE 10 ML: 2.5 INJECTION, SOLUTION EPIDURAL; INFILTRATION; INTRACAUDAL; PERINEURAL at 09:07

## 2017-07-12 NOTE — H&P (VIEW-ONLY)
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 relief. We can repeat this should her pain returns.     - She has also had relief with cervical HOWARD in the past. We can repeat this in the future.     - Trial Flexeril 10 mg TID PRN.     - The patient will continue a home exercise routine to help with pain and strengthening.      - RTC 2 weeks after above procedure.     - Dr. Zafar was consulted on the patient and agrees with this plan.    The above plan and management options were discussed at length with patient. Patient is in agreement with the above and verbalized understanding.     Sharee Serrano NP  06/30/2017

## 2017-07-12 NOTE — OP NOTE
Lumbar Medial nerve branch block radiofrequency ablation Under Fluoroscopy     Time-out taken to identify patient and procedure side prior to starting the procedure.     07/12/2017    PROCEDURE: Right radiofrequency ablation of the the medial branch nerves at the   transverse process of  L4, L5 and sacral ala    2)Conscious sedation provided by MD     REASON FOR PROCEDURE: Facet arthropathy, lumbar     PHYSICIAN: Richmond Zafar MD     Assistants:   Gabbi Cota MD PGY-3  I was present and supervising all critical portions of the procedure      MEDICATIONS INJECTED: 0.25% Bupivicaine total 6mL     LOCAL ANESTHETIC USED: Xylocaine 1% 1mL / site     ESTIMATED BLOOD LOSS: None.     COMPLICATIONS: None.     Interval history: Patient reports that he had complete relief of pain for the day of the procedure, we will proceed with the RFA     TECHNIQUE: Laying in a prone position, the patient was prepped and draped in the usual sterile fashion using ChloraPrep and fenestrated drape. The level was determined under fluoroscopic guidance. Local anesthetic was given by going down to the hub of the 27-gauge 1.25in needle and raising a wheel. A 18-gauge 10mm curved active tip needle was introduced to the anatomic local of the medial branch at each of the above levels using fluoroscopy. Then sensory and motor testing was performed to confirm that the needle tips were in the correct location. Then after negative aspiration, 1 mL of 0.25% bupivacaine was injected into each level. This was followed by thermal lesioning at 80 degrees celsius for 90 seconds.     Conscious sedation provided by M.D   The patient was monitored with continuous pulse oximetry, EKG, and intermittent blood pressure monitors. The patient was hemodynamically stable throughout the entire process was responsive to voice, and breathing spontaneously. Supplemental O2 was provided at 2L/min via nasal cannula. Patient was comfortable for the duration of the procedure.      There was a total of 2mg IV Midazolam and 100mcg Fentanyl titrated for the procedure    The patient tolerated the procedure well. Was able to move their leg at the knee and ankle at the conclusion of the procedure    The patient was monitored after the procedure. Patient was given post procedure and discharge instructions to follow at home. We will see the patient back in two weeks or the patient may call to inform of status. The patient was discharged in a stable condition

## 2017-07-12 NOTE — DISCHARGE INSTRUCTIONS
Home Care Instructions Pain Management:    1. DIET:   You may resume your normal diet today.   2. BATHING:   You may shower with luke warm water. No soaking in tub.  3. DRESSING:   You may remove your bandage today.   4. ACTIVITY LEVEL:   You may resume your normal activities 24 hrs after your procedure.  5. MEDICATIONS:   You may resume your normal medications today.   6. SPECIAL INSTRUCTIONS:   No heat to the injection site for 24 hrs including, bath or shower, heating pad, moist heat, or hot tubs.    Use ice pack to injection site for any pain or discomfort.  Apply ice packs for 20 minute intervals as needed.   If you have received any sedatives by mouth today you may not drive for 12 hours.    If you have received any sedation through your IV, you may not drive for 24 hrs.     PLEASE CALL YOUR DOCTOR IF:  1. Redness or swelling around the injection site.  2. Fever of 101 degrees  3. Drainage (pus) from the injection site.  4. For any continuous bleeding (some dried blood over the incision is normal.)  5. For severe headache that is relieved when lying flat.    FOR EMERGENCIES:   If any unusual problems or difficulties occur during clinic hours, call [phone] or [phone].   Procedural Sedation  Procedural sedation is medicine to ease discomfort, pain, and anxiety during a procedure. The medicine is often given through an intravenous (IV) line in your arm or hand. In some cases, the medicine may be taken by mouth or inhaled. While you are under sedation, you will likely be awake. But you may not remember anything afterward.  Why procedural sedation is used  Sedation is used for many types of procedures. The goal is to reduce pain, anxiety, and stressful memories of a procedure. It can also help your health care provider treat you. For example, having a broken bone fixed may be easier if you feel relaxed.  Procedural sedation is used only for short, basic procedures. It is not used for complex surgeries. Some  procedures that use this type of sedation include:  · Dental surgery  · Breast biopsy, to take a sample of breast tissue  · Endoscopy, to look at gastrointestinal problems  · Bronchoscopy, to check for lung problems  · Bone or joint realignment, to fix a broken bone or dislocated joint  · Minor foot or skin surgery  · Electrical cardioversion, to restore a normal heart rhythm  · Lumbar puncture, to assess neurological disease  Risks of procedural sedation  Procedural sedation has some risks and possible side effects, such as:  · Headache  · Nausea and vomiting  · Unpleasant memory of the procedure  · Lowered rate of breathing  · Changes in heart rate and blood pressure (rare)  · Inhalation of stomach contents into your lungs (rare)  Side effects will likely go away shortly after the procedure. Your health care team will watch your heart rate and breathing during your sedation. This is to help prevent problems.  Your own risks may vary based on your age and your overall health. They also depend on the type of sedation you are given. Talk with your health care provider about the risks that apply most to you.  Getting ready for procedural sedation  Talk with your health care provider how to get ready for your procedure. Tell him or her about all the medicines you take. This includes over-the-counter medicines such as ibuprofen. It also includes vitamins, herbs, and other supplements. You may need to stop taking some medicines before the procedure, such as blood thinners and aspirin. If you smoke, you may need to stop. Talk with your health care provider if you need help to stop smoking.  Tell your health care provider if you:  · Have had any problems in the past with sedation or anesthesia  · Have had any recent changes in your health, such as an infection or fever  · Are pregnant or think you may be pregnant  Also, make sure to:  · Ask a family member or friend to take you home after the procedure. You cannot drive on  the day you receive sedation.  · Not eat or drink after midnight the night before your procedure, if advised.  · Follow all other instructions from your health care provider.  During your procedural sedation  You may have your procedure in a hospital or a medical clinic. Sedation is done by a trained health care provider. In general, you can expect the following:  · You will be given medicine through an IV line in your arm or hand. Or you may receive a shot. The medicine may also be given by mouth. Or you may inhale it through a mask.  · If you receive medicine through an IV, you may feel the effects very quickly. You will start to feel relaxed and drowsy.  · During the procedure, your heart rate, breathing, and blood pressure will be closely watched. Your breathing and blood pressure may decrease a little. But you will likely not need help with your breathing. You may receive a little extra oxygen through a mask.  · You will probably be awake the entire time. If you do fall asleep, you should be easy to wake up, if needed. You should feel little or no pain.  · When your procedure is over, the sedative medicine will be stopped.  After your procedural sedation  You will begin to feel more awake and aware. But you will likely be drowsy for a while afterward. You will be closely watched as you become more alert. You may have a faint memory of the procedure. Or you may not remember it at all.  You should be able to return home within an hour or two after your procedure. Plan to have someone stay with you for a few hours. Side effects such as headache and nausea may go away quickly. Tell your health care provider if they continue.  Dont drive or make any important decisions for at least 24 hours. Be sure to follow all after-care instructions.      When to call your health care provider  Have someone call your health care provider right away if you have any of these:  · Drowsiness that gets worse  · Weakness or dizziness  that gets worse  · Repeated vomiting  · You cant be awakened   Date Last Reviewed: 2/6/2015  © 5907-4273 [employer]. [address] [place] [address], [place] [address]. All rights reserved. This information is not intended as a substitute for professional medical care. Always follow your healthcare professional's instructions.

## 2017-07-12 NOTE — DISCHARGE SUMMARY
Discharge Note  Short Stay      SUMMARY     Admit Date: 7/12/2017    Attending Physician: Richmond Zafar      Discharge Physician: Richmond Zafar      Discharge Date: 7/12/2017 10:13 AM     PROCEDURE: Right radiofrequency ablation of the the medial branch nerves at the   transverse process of  L4, L5 and sacral ala    2)Conscious sedation provided by MD     REASON FOR PROCEDURE: Facet arthropathy, lumbar     Disposition: Home or self care    Patient Instructions:   Current Discharge Medication List      CONTINUE these medications which have NOT CHANGED    Details   alprazolam (XANAX) 1 MG tablet Refills: 2      calcium-vitamin D3 500 mg(1,250mg) -200 unit per tablet Take 1 tablet by mouth 2 (two) times daily with meals.      CRESTOR 10 mg tablet Take 10 mg by mouth once daily.      cyanocobalamin, vitamin B-12, (VITAMELTS ENERGY) 1,500 mcg TbDL Take by mouth.      cyclobenzaprine (FLEXERIL) 10 MG tablet Take 1 tablet (10 mg total) by mouth 3 (three) times daily as needed for Muscle spasms.  Qty: 90 tablet, Refills: 1    Associated Diagnoses: Facet arthritis of lumbar region; Sacroiliac joint pain; DDD (degenerative disc disease), lumbar; Cervical radiculopathy; Myofascial pain      infliximab (REMICADE) 100 mg injection Inject 100 mg into the vein.      mesalamine (APRISO) 0.375 gram Cp24 Take 0.375 g by mouth once daily.      ranitidine (ZANTAC) 150 MG tablet Take 150 mg by mouth 2 (two) times daily.      amitriptyline (ELAVIL) 25 MG tablet Take 25 mg by mouth once daily.  Refills: 5      aripiprazole (ABILIFY) 2 MG Tab Take 2 mg by mouth once daily.  Refills: 5      DOC-Q-LACE 100 mg capsule Refills: 3      fish,bora,flax oils-om3,6,9 #1 (TRIPLE OMEGA 3-6-9) 400-400-400 mg Cap Take by mouth.      Lacto gasseri-B bifid-B longum 1.5 billion cell Cap Take 1 capsule by mouth once daily. Lara colon health probiotic      lubiprostone (AMITIZA) 24 MCG Cap Take 24 mcg by mouth 2 (two) times daily.      mometasone  (NASONEX) 50 mcg/actuation nasal spray SHAKE LQ AND U 2 SPRAYS IEN QD  Refills: 2      multivitamin capsule Take 1 capsule by mouth once daily.      polyethylene glycol (GLYCOLAX) 17 gram/dose powder Take 17 g by mouth 3 (three) times daily.       PROAIR HFA 90 mcg/actuation inhaler       promethazine (PHENERGAN) 25 MG tablet Refills: 0      rabeprazole (ACIPHEX) 20 mg tablet TK 1 T PO QD  Refills: 11      UBIDECARENONE (COENZYME Q10) 60 mg Cap Take 100 mg by mouth once daily.      varenicline (CHANTIX KAJAL) 0.5 mg (11)- 1 mg (42) tablet Take by mouth. Take one 0.5mg tab by mouth once daily X3 days,then increase to one 0.5mg tab twice daily X4 days,then increase to one 1mg tab twice daily             Resume home diet and activity

## 2017-07-26 ENCOUNTER — SURGERY (OUTPATIENT)
Age: 52
End: 2017-07-26

## 2017-07-26 ENCOUNTER — HOSPITAL ENCOUNTER (OUTPATIENT)
Facility: OTHER | Age: 52
Discharge: HOME | End: 2017-07-26
Attending: ANESTHESIOLOGY | Admitting: ANESTHESIOLOGY
Payer: MEDICARE

## 2017-07-26 VITALS
BODY MASS INDEX: 20.83 KG/M2 | RESPIRATION RATE: 18 BRPM | HEIGHT: 64 IN | WEIGHT: 122 LBS | SYSTOLIC BLOOD PRESSURE: 130 MMHG | OXYGEN SATURATION: 100 % | HEART RATE: 72 BPM | TEMPERATURE: 98 F | DIASTOLIC BLOOD PRESSURE: 75 MMHG

## 2017-07-26 DIAGNOSIS — G89.29 CHRONIC PAIN: ICD-10-CM

## 2017-07-26 DIAGNOSIS — M47.815 SPONDYLOSIS OF THORACOLUMBAR REGION WITHOUT MYELOPATHY OR RADICULOPATHY: Primary | ICD-10-CM

## 2017-07-26 DIAGNOSIS — M47.816 LUMBAR FACET ARTHROPATHY: ICD-10-CM

## 2017-07-26 PROCEDURE — 25000003 PHARM REV CODE 250: Performed by: ANESTHESIOLOGY

## 2017-07-26 PROCEDURE — 25000003 PHARM REV CODE 250: Performed by: PAIN MEDICINE

## 2017-07-26 PROCEDURE — 64636 DESTROY L/S FACET JNT ADDL: CPT | Mod: LT,,, | Performed by: ANESTHESIOLOGY

## 2017-07-26 PROCEDURE — 64636 DESTROY L/S FACET JNT ADDL: CPT | Performed by: ANESTHESIOLOGY

## 2017-07-26 PROCEDURE — 64635 DESTROY LUMB/SAC FACET JNT: CPT | Performed by: ANESTHESIOLOGY

## 2017-07-26 PROCEDURE — 63600175 PHARM REV CODE 636 W HCPCS: Performed by: ANESTHESIOLOGY

## 2017-07-26 PROCEDURE — 99152 MOD SED SAME PHYS/QHP 5/>YRS: CPT | Mod: ,,, | Performed by: ANESTHESIOLOGY

## 2017-07-26 PROCEDURE — 64635 DESTROY LUMB/SAC FACET JNT: CPT | Mod: LT,,, | Performed by: ANESTHESIOLOGY

## 2017-07-26 RX ORDER — SODIUM CHLORIDE 9 MG/ML
500 INJECTION, SOLUTION INTRAVENOUS CONTINUOUS
Status: DISCONTINUED | OUTPATIENT
Start: 2017-07-26 | End: 2017-07-26 | Stop reason: HOSPADM

## 2017-07-26 RX ORDER — LIDOCAINE HYDROCHLORIDE 10 MG/ML
INJECTION INFILTRATION; PERINEURAL
Status: DISCONTINUED | OUTPATIENT
Start: 2017-07-26 | End: 2017-07-26 | Stop reason: HOSPADM

## 2017-07-26 RX ORDER — BUPIVACAINE HYDROCHLORIDE 2.5 MG/ML
INJECTION, SOLUTION EPIDURAL; INFILTRATION; INTRACAUDAL
Status: DISCONTINUED | OUTPATIENT
Start: 2017-07-26 | End: 2017-07-26 | Stop reason: HOSPADM

## 2017-07-26 RX ORDER — FENTANYL CITRATE 50 UG/ML
INJECTION, SOLUTION INTRAMUSCULAR; INTRAVENOUS
Status: DISCONTINUED | OUTPATIENT
Start: 2017-07-26 | End: 2017-07-26 | Stop reason: HOSPADM

## 2017-07-26 RX ORDER — MIDAZOLAM HYDROCHLORIDE 1 MG/ML
INJECTION INTRAMUSCULAR; INTRAVENOUS
Status: DISCONTINUED | OUTPATIENT
Start: 2017-07-26 | End: 2017-07-26 | Stop reason: HOSPADM

## 2017-07-26 RX ADMIN — FENTANYL CITRATE 50 MCG: 50 INJECTION, SOLUTION INTRAMUSCULAR; INTRAVENOUS at 09:07

## 2017-07-26 RX ADMIN — MIDAZOLAM HYDROCHLORIDE 1 MG: 1 INJECTION, SOLUTION INTRAMUSCULAR; INTRAVENOUS at 09:07

## 2017-07-26 RX ADMIN — BUPIVACAINE HYDROCHLORIDE 10 ML: 2.5 INJECTION, SOLUTION EPIDURAL; INFILTRATION; INTRACAUDAL; PERINEURAL at 09:07

## 2017-07-26 RX ADMIN — SODIUM CHLORIDE 500 ML: 0.9 INJECTION, SOLUTION INTRAVENOUS at 09:07

## 2017-07-26 RX ADMIN — LIDOCAINE HYDROCHLORIDE 10 ML: 10 INJECTION, SOLUTION INFILTRATION; PERINEURAL at 09:07

## 2017-07-26 NOTE — OP NOTE
Lumbar Medial nerve branch block radiofrequency ablation Under Fluoroscopy     Time-out taken to identify patient and procedure side prior to starting the procedure.     07/26/2017    PROCEDURE: Left radiofrequency ablation of the the medial branch nerves at the   transverse process of  L4, L5 and sacral ala    2)Conscious sedation provided by MD     REASON FOR PROCEDURE: Facet arthropathy, lumbar    PHYSICIAN: Richmond Zafar MD     Assistants:   Evens Contreras MD, PGY-5, Pain Fellow  I was present and supervising all critical portions of the procedure      MEDICATIONS INJECTED: 0.25% Bupivicaine total 6mL     LOCAL ANESTHETIC USED: Xylocaine 1% 1mL / site     ESTIMATED BLOOD LOSS: None.     COMPLICATIONS: None.     Interval history: Patient reports that he had complete relief of pain for the day of the procedure, we will proceed with the RFA     TECHNIQUE: Laying in a prone position, the patient was prepped and draped in the usual sterile fashion using ChloraPrep and fenestrated drape. The level was determined under fluoroscopic guidance. Local anesthetic was given by going down to the hub of the 27-gauge 1.25in needle and raising a wheel. A 18-gauge 10mm curved active tip needle was introduced to the anatomic local of the medial branch at each of the above levels using fluoroscopy. Then sensory and motor testing was performed to confirm that the needle tips were in the correct location. Then after negative aspiration, 1 mL of 0.25% bupivacaine was injected into each level. This was followed by thermal lesioning at 80 degrees celsius for 90 seconds.     Conscious sedation provided by M.D   The patient was monitored with continuous pulse oximetry, EKG, and intermittent blood pressure monitors. The patient was hemodynamically stable throughout the entire process was responsive to voice, and breathing spontaneously. Supplemental O2 was provided at 2L/min via nasal cannula. Patient was comfortable for the duration of  the procedure.     There was a total of 2mg IV Midazolam and 100mcg Fentanyl titrated for the procedure    The patient tolerated the procedure well. Was able to move their leg at the knee and ankle at the conclusion of the procedure    The patient was monitored after the procedure. Patient was given post procedure and discharge instructions to follow at home. We will see the patient back in two weeks or the patient may call to inform of status. The patient was discharged in a stable condition

## 2017-07-26 NOTE — H&P (VIEW-ONLY)
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 relief. We can repeat this should her pain returns.     - She has also had relief with cervical HOWARD in the past. We can repeat this in the future.     - Trial Flexeril 10 mg TID PRN.     - The patient will continue a home exercise routine to help with pain and strengthening.      - RTC 2 weeks after above procedure.     - Dr. Zafar was consulted on the patient and agrees with this plan.    The above plan and management options were discussed at length with patient. Patient is in agreement with the above and verbalized understanding.     Sharee Serrano NP  06/30/2017

## 2017-07-26 NOTE — DISCHARGE SUMMARY
Discharge Note  Short Stay      SUMMARY     Admit Date: 7/26/2017    Attending Physician: Richmond Zafar      Discharge Physician: Richmond Zafar      Discharge Date: 7/26/2017 9:32 AM     PROCEDURE: Left radiofrequency ablation of the the medial branch nerves at the   transverse process of  L4, L5 and sacral ala    2)Conscious sedation provided by MD     REASON FOR PROCEDURE: Facet arthropathy, lumbar    Disposition: Home or self care    Patient Instructions:   Current Discharge Medication List      CONTINUE these medications which have NOT CHANGED    Details   alprazolam (XANAX) 1 MG tablet Refills: 2      amitriptyline (ELAVIL) 25 MG tablet Take 25 mg by mouth once daily.  Refills: 5      aripiprazole (ABILIFY) 2 MG Tab Take 2 mg by mouth once daily.  Refills: 5      calcium-vitamin D3 500 mg(1,250mg) -200 unit per tablet Take 1 tablet by mouth 2 (two) times daily with meals.      CRESTOR 10 mg tablet Take 10 mg by mouth once daily.      cyanocobalamin, vitamin B-12, (VITAMELTS ENERGY) 1,500 mcg TbDL Take by mouth.      cyclobenzaprine (FLEXERIL) 10 MG tablet Take 1 tablet (10 mg total) by mouth 3 (three) times daily as needed for Muscle spasms.  Qty: 90 tablet, Refills: 1    Associated Diagnoses: Facet arthritis of lumbar region; Sacroiliac joint pain; DDD (degenerative disc disease), lumbar; Cervical radiculopathy; Myofascial pain      fish,bora,flax oils-om3,6,9 #1 (TRIPLE OMEGA 3-6-9) 400-400-400 mg Cap Take by mouth.      infliximab (REMICADE) 100 mg injection Inject 100 mg into the vein.      Lacto gasseri-B bifid-B longum 1.5 billion cell Cap Take 1 capsule by mouth once daily. Lara colon health probiotic      mesalamine (APRISO) 0.375 gram Cp24 Take 0.375 g by mouth once daily.      mometasone (NASONEX) 50 mcg/actuation nasal spray SHAKE LQ AND U 2 SPRAYS IEN QD  Refills: 2      multivitamin capsule Take 1 capsule by mouth once daily.      polyethylene glycol (GLYCOLAX) 17 gram/dose powder Take 17  g by mouth 3 (three) times daily.       PROAIR HFA 90 mcg/actuation inhaler       promethazine (PHENERGAN) 25 MG tablet Refills: 0      rabeprazole (ACIPHEX) 20 mg tablet TK 1 T PO QD  Refills: 11      ranitidine (ZANTAC) 150 MG tablet Take 150 mg by mouth 2 (two) times daily.      UBIDECARENONE (COENZYME Q10) 60 mg Cap Take 100 mg by mouth once daily.      DOC-Q-LACE 100 mg capsule Refills: 3      lubiprostone (AMITIZA) 24 MCG Cap Take 24 mcg by mouth 2 (two) times daily.      varenicline (CHANTIX KAJAL) 0.5 mg (11)- 1 mg (42) tablet Take by mouth. Take one 0.5mg tab by mouth once daily X3 days,then increase to one 0.5mg tab twice daily X4 days,then increase to one 1mg tab twice daily             Resume home diet and activity

## 2017-07-26 NOTE — DISCHARGE INSTRUCTIONS
Home Care Instructions Pain Management:    1. DIET:   You may resume your normal diet today.   2. BATHING:   You may shower with luke warm water. No tub baths or anything that will soak injection sites under water for 24 hours.  3. DRESSING:   You may remove your bandage today.   4. ACTIVITY LEVEL:   You may resume your normal activities 24 hrs after your procedure. Nothing strenuous today.  5. MEDICATIONS:   You may resume your normal medications today. To restart blood thinners, ask your doctor.  6. SPECIAL INSTRUCTIONS:   No heat to the injection site for 24 hrs including, bath or shower, heating pad, moist heat, or hot tubs.    Use ice pack to injection site for any pain or discomfort.  Apply ice packs for 20 minute intervals as needed.     If you have received any sedatives by mouth today you may not drive for 12 hours.    If you have received any sedation through your IV, you may not drive for 24 hrs.     PLEASE CALL YOUR DOCTOR IF:  1. Redness or swelling around the injection site.  2. Fever of 101 degrees or more  3. Drainage (pus) from the injection site.  4. For any continuous bleeding (some dried blood over the incision is normal.)    FOR EMERGENCIES:   If any unusual problems or difficulties occur during clinic hours, call [phone] or 294.     Procedural Sedation  Procedural sedation is medicine to ease discomfort, pain, and anxiety during a procedure. The medicine is often given through an intravenous (IV) line in your arm or hand. In some cases, the medicine may be taken by mouth or inhaled. While you are under sedation, you will likely be awake. But you may not remember anything afterward.  Why procedural sedation is used  Sedation is used for many types of procedures. The goal is to reduce pain, anxiety, and stressful memories of a procedure. It can also help your health care provider treat you. For example, having a broken bone fixed may be easier if you feel relaxed.  Procedural sedation is  used only for short, basic procedures. It is not used for complex surgeries. Some procedures that use this type of sedation include:  · Dental surgery  · Breast biopsy, to take a sample of breast tissue  · Endoscopy, to look at gastrointestinal problems  · Bronchoscopy, to check for lung problems  · Bone or joint realignment, to fix a broken bone or dislocated joint  · Minor foot or skin surgery  · Electrical cardioversion, to restore a normal heart rhythm  · Lumbar puncture, to assess neurological disease  Risks of procedural sedation  Procedural sedation has some risks and possible side effects, such as:  · Headache  · Nausea and vomiting  · Unpleasant memory of the procedure  · Lowered rate of breathing  · Changes in heart rate and blood pressure (rare)  · Inhalation of stomach contents into your lungs (rare)  Side effects will likely go away shortly after the procedure. Your health care team will watch your heart rate and breathing during your sedation. This is to help prevent problems.  Your own risks may vary based on your age and your overall health. They also depend on the type of sedation you are given. Talk with your health care provider about the risks that apply most to you.  Getting ready for procedural sedation  Talk with your health care provider how to get ready for your procedure. Tell him or her about all the medicines you take. This includes over-the-counter medicines such as ibuprofen. It also includes vitamins, herbs, and other supplements. You may need to stop taking some medicines before the procedure, such as blood thinners and aspirin. If you smoke, you may need to stop. Talk with your health care provider if you need help to stop smoking.  Tell your health care provider if you:  · Have had any problems in the past with sedation or anesthesia  · Have had any recent changes in your health, such as an infection or fever  · Are pregnant or think you may be pregnant  Also, make sure to:  · Ask a  family member or friend to take you home after the procedure. You cannot drive on the day you receive sedation.  · Not eat or drink after midnight the night before your procedure, if advised.  · Follow all other instructions from your health care provider.  During your procedural sedation  You may have your procedure in a hospital or a medical clinic. Sedation is done by a trained health care provider. In general, you can expect the following:  · You will be given medicine through an IV line in your arm or hand. Or you may receive a shot. The medicine may also be given by mouth. Or you may inhale it through a mask.  · If you receive medicine through an IV, you may feel the effects very quickly. You will start to feel relaxed and drowsy.  · During the procedure, your heart rate, breathing, and blood pressure will be closely watched. Your breathing and blood pressure may decrease a little. But you will likely not need help with your breathing. You may receive a little extra oxygen through a mask.  · You will probably be awake the entire time. If you do fall asleep, you should be easy to wake up, if needed. You should feel little or no pain.  · When your procedure is over, the sedative medicine will be stopped.  After your procedural sedation  You will begin to feel more awake and aware. But you will likely be drowsy for a while afterward. You will be closely watched as you become more alert. You may have a faint memory of the procedure. Or you may not remember it at all.  You should be able to return home within an hour or two after your procedure. Plan to have someone stay with you for a few hours. Side effects such as headache and nausea may go away quickly. Tell your health care provider if they continue.  Dont drive or make any important decisions for at least 24 hours. Be sure to follow all after-care instructions.      When to call your health care provider  Have someone call your health care provider right  away if you have any of these:  · Drowsiness that gets worse  · Weakness or dizziness that gets worse  · Repeated vomiting  · You cant be awakened   Date Last Reviewed: 2/6/2015  © 7435-0058 [employer]. [address] [place] [address], [place] [address]. All rights reserved. This information is not intended as a substitute for professional medical care. Always follow your healthcare professional's instructions.

## 2017-07-26 NOTE — INTERVAL H&P NOTE
"The patient has been examined and the H&P has been reviewed:    I concur with the findings and no changes have occurred since H&P was written.     HPI  Patient presents for scheduled Lumbar medial branch RFA    PMHx, PSHx, Allergies, Medications reviewed in epic    ROS negative except pain complaints in HPI    OBJECTIVE:    /73 (BP Location: Right arm, Patient Position: Lying, BP Method: Automatic)   Pulse 62   Temp 97.5 °F (36.4 °C) (Oral)   Resp 18   Ht 5' 4" (1.626 m)   Wt 55.3 kg (122 lb)   LMP 07/11/2013   SpO2 98%   BMI 20.94 kg/m²     PHYSICAL EXAMINATION:    GENERAL: Well appearing, in no acute distress, alert and oriented x3.  PSYCH:  Mood and affect appropriate.  SKIN: Skin color, texture, turgor normal, no rashes or lesions.  CV: RRR with palpation of the radial artery.  PULM: No evidence of respiratory difficulty, symmetric chest rise. Clear to auscultation.  NEURO: Cranial nerves grossly intact.    Plan:    Proceed with procedure as planned    Evens Contreras  07/26/2017      Anesthesia/Surgery risks, benefits and alternative options discussed and understood by patient/family.          Active Hospital Problems    Diagnosis  POA    Chronic pain [G89.29]  Yes      Resolved Hospital Problems    Diagnosis Date Resolved POA   No resolved problems to display.     "

## 2017-08-14 ENCOUNTER — OFFICE VISIT (OUTPATIENT)
Dept: PAIN MEDICINE | Facility: CLINIC | Age: 52
End: 2017-08-14
Payer: MEDICARE

## 2017-08-14 VITALS
BODY MASS INDEX: 21 KG/M2 | DIASTOLIC BLOOD PRESSURE: 88 MMHG | HEIGHT: 64 IN | SYSTOLIC BLOOD PRESSURE: 138 MMHG | TEMPERATURE: 99 F | WEIGHT: 123 LBS | HEART RATE: 68 BPM

## 2017-08-14 DIAGNOSIS — M51.36 DDD (DEGENERATIVE DISC DISEASE), LUMBAR: ICD-10-CM

## 2017-08-14 DIAGNOSIS — M47.816 FACET ARTHRITIS OF LUMBAR REGION: ICD-10-CM

## 2017-08-14 DIAGNOSIS — G89.4 CHRONIC PAIN DISORDER: ICD-10-CM

## 2017-08-14 DIAGNOSIS — M53.3 SACROILIAC JOINT PAIN: ICD-10-CM

## 2017-08-14 DIAGNOSIS — M79.10 MYALGIA: Primary | ICD-10-CM

## 2017-08-14 PROCEDURE — 3008F BODY MASS INDEX DOCD: CPT | Mod: ,,, | Performed by: NURSE PRACTITIONER

## 2017-08-14 PROCEDURE — 99213 OFFICE O/P EST LOW 20 MIN: CPT | Mod: S$PBB,,, | Performed by: NURSE PRACTITIONER

## 2017-08-14 PROCEDURE — 99999 PR PBB SHADOW E&M-EST. PATIENT-LVL III: CPT | Mod: PBBFAC,,, | Performed by: NURSE PRACTITIONER

## 2017-08-14 PROCEDURE — 99213 OFFICE O/P EST LOW 20 MIN: CPT | Mod: PBBFAC | Performed by: NURSE PRACTITIONER

## 2017-08-14 RX ORDER — DICYCLOMINE HYDROCHLORIDE 20 MG/1
20 TABLET ORAL EVERY 6 HOURS
COMMUNITY

## 2017-08-24 ENCOUNTER — PROCEDURE VISIT (OUTPATIENT)
Dept: PAIN MEDICINE | Facility: CLINIC | Age: 52
End: 2017-08-24
Attending: ANESTHESIOLOGY
Payer: MEDICARE

## 2017-08-24 VITALS
TEMPERATURE: 99 F | RESPIRATION RATE: 18 BRPM | SYSTOLIC BLOOD PRESSURE: 113 MMHG | DIASTOLIC BLOOD PRESSURE: 72 MMHG | WEIGHT: 119.06 LBS | HEIGHT: 64 IN | BODY MASS INDEX: 20.32 KG/M2 | HEART RATE: 65 BPM

## 2017-08-24 DIAGNOSIS — M54.12 CERVICAL RADICULOPATHY: Primary | ICD-10-CM

## 2017-08-24 DIAGNOSIS — G89.4 CHRONIC PAIN SYNDROME: ICD-10-CM

## 2017-08-24 DIAGNOSIS — M79.10 MYALGIA: ICD-10-CM

## 2017-08-24 DIAGNOSIS — M47.812 FACET ARTHRITIS OF CERVICAL REGION: ICD-10-CM

## 2017-08-24 PROCEDURE — 20553 NJX 1/MLT TRIGGER POINTS 3/>: CPT | Mod: PBBFAC | Performed by: ANESTHESIOLOGY

## 2017-08-24 PROCEDURE — 99213 OFFICE O/P EST LOW 20 MIN: CPT | Mod: 25,S$PBB,, | Performed by: ANESTHESIOLOGY

## 2017-08-24 PROCEDURE — 20553 NJX 1/MLT TRIGGER POINTS 3/>: CPT | Mod: S$PBB,,, | Performed by: ANESTHESIOLOGY

## 2017-08-24 RX ORDER — METHYLPREDNISOLONE ACETATE 40 MG/ML
40 INJECTION, SUSPENSION INTRA-ARTICULAR; INTRALESIONAL; INTRAMUSCULAR; SOFT TISSUE ONCE
Status: COMPLETED | OUTPATIENT
Start: 2017-08-24 | End: 2017-08-24

## 2017-08-24 RX ORDER — METOCLOPRAMIDE 5 MG/1
TABLET ORAL
COMMUNITY
Start: 2017-08-21

## 2017-08-24 RX ORDER — BUPIVACAINE HYDROCHLORIDE 2.5 MG/ML
9 INJECTION, SOLUTION EPIDURAL; INFILTRATION; INTRACAUDAL ONCE
Status: COMPLETED | OUTPATIENT
Start: 2017-08-24 | End: 2017-08-24

## 2017-08-24 RX ADMIN — BUPIVACAINE HYDROCHLORIDE 22.5 MG: 2.5 INJECTION, SOLUTION EPIDURAL; INFILTRATION; INTRACAUDAL; PERINEURAL at 11:08

## 2017-08-24 RX ADMIN — METHYLPREDNISOLONE ACETATE 40 MG: 40 INJECTION, SUSPENSION INTRA-ARTICULAR; INTRALESIONAL; INTRAMUSCULAR; SOFT TISSUE at 11:08

## 2017-08-24 NOTE — PROGRESS NOTES
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 physiologic and symmetric.  Abraham's negative. No clonus.  Decreased sensation to light touch over the medial aspect of the left arm.  GAIT: Antalgic- ambulating without assistance.        Assessment:       1. Cervical radiculopathy  MRI Cervical Spine W WO Cont   2. Facet arthritis of cervical region  MRI Cervical Spine W WO Cont   3. Chronic pain syndrome  MRI Cervical Spine W WO Cont   4. Myalgia  bupivacaine (PF) 0.25% (2.5 mg/ml) injection 22.5 mg    methylPREDNISolone acetate injection 40 mg          Plan:       - Previous imaging was reviewed and discussed with the patient today.      - Trigger Point Injection:   The procedure was discussed with the patient including complications of nerve damage,  bleeding, infection, and failure of pain relief.   Trigger points were identified by palpation and marked. Chlorhexidine prep of sites done. A mixture of 9mL 0.25% bupivacaine +40mg Depo-Medrol was prepared (10 mL total).   A 27-gauge needle was advanced to the point of maximal tenderness, and  1.5 mL  was injected after negative aspiration. All sites done in the same manner. Patient tolerated the procedure well and without complications. Sites injected included: paracervical, trapezius and paraspinal muscles on the left side     - She is s/p lumbar RFA with significant relief. We can repeat this  in the future.     - She is s/p cervical RFA with significant relief. We can repeat this should her pain returns.     - She has also had relief with cervical HOWARD in the past. We can repeat this in the future.     - MRI cervical spine to compare to previous     F/u after imaging.    The above plan and management options were discussed at length with patient. Patient is in agreement with the above and verbalized understanding.     Richmond Zafar MD  08/24/2017

## 2017-08-28 ENCOUNTER — TELEPHONE (OUTPATIENT)
Dept: PAIN MEDICINE | Facility: CLINIC | Age: 52
End: 2017-08-28

## 2017-08-28 DIAGNOSIS — G89.4 CHRONIC PAIN SYNDROME: Primary | ICD-10-CM

## 2017-08-28 DIAGNOSIS — G57.00 PIRIFORMIS SYNDROME, UNSPECIFIED LATERALITY: ICD-10-CM

## 2017-08-28 NOTE — TELEPHONE ENCOUNTER
----- Message from Matthew Graves sent at 8/28/2017 12:07 PM CDT -----  Contact: Elba Rock  _X  1st Request  _  2nd Request  _  3rd Request        Who: Elba Rock    Why: Need orders for the patient to have blood work and need the orders to be STAT. Please call back to follow up.    What Number to Call Back: [phone]    When to Expect a call back: (With in 24 hours)      Called patient, orders placed for STAT creatine to be drawn prior to MRI tomorrow, pt stated that MRI staff had already informed her of the order

## 2017-08-29 ENCOUNTER — HOSPITAL ENCOUNTER (OUTPATIENT)
Dept: RADIOLOGY | Facility: OTHER | Age: 52
Discharge: HOME | End: 2017-08-29
Attending: ANESTHESIOLOGY
Payer: MEDICARE

## 2017-08-29 DIAGNOSIS — G89.4 CHRONIC PAIN SYNDROME: ICD-10-CM

## 2017-08-29 DIAGNOSIS — M54.12 CERVICAL RADICULOPATHY: ICD-10-CM

## 2017-08-29 DIAGNOSIS — M47.812 FACET ARTHRITIS OF CERVICAL REGION: ICD-10-CM

## 2017-08-29 PROCEDURE — 72156 MRI NECK SPINE W/O & W/DYE: CPT | Mod: TC

## 2017-08-29 PROCEDURE — 72156 MRI NECK SPINE W/O & W/DYE: CPT | Mod: 26,,, | Performed by: RADIOLOGY

## 2017-08-29 PROCEDURE — 25500020 PHARM REV CODE 255: Performed by: ANESTHESIOLOGY

## 2017-08-29 PROCEDURE — A9585 GADOBUTROL INJECTION: HCPCS | Performed by: ANESTHESIOLOGY

## 2017-08-29 RX ORDER — GADOBUTROL 604.72 MG/ML
5.5 INJECTION INTRAVENOUS
Status: COMPLETED | OUTPATIENT
Start: 2017-08-29 | End: 2017-08-29

## 2017-08-29 RX ADMIN — GADOBUTROL 5.5 ML: 604.72 INJECTION INTRAVENOUS at 01:08

## 2017-09-27 ENCOUNTER — OFFICE VISIT (OUTPATIENT)
Dept: PSYCHIATRY | Facility: CLINIC | Age: 52
End: 2017-09-27
Payer: MEDICARE

## 2017-09-27 DIAGNOSIS — F41.9 ANXIETY: ICD-10-CM

## 2017-09-27 DIAGNOSIS — G89.4 CHRONIC PAIN SYNDROME: ICD-10-CM

## 2017-09-27 DIAGNOSIS — M96.1 FAILED BACK SURGICAL SYNDROME: ICD-10-CM

## 2017-09-27 DIAGNOSIS — F33.2 SEVERE EPISODE OF RECURRENT MAJOR DEPRESSIVE DISORDER, WITHOUT PSYCHOTIC FEATURES: ICD-10-CM

## 2017-09-27 DIAGNOSIS — Z01.818 PRE-OP EXAM: Primary | ICD-10-CM

## 2017-09-27 PROCEDURE — 96101 PR PSYCHOLOGIC TESTING BY PSYCH/PHYS: CPT | Mod: S$PBB,,, | Performed by: PSYCHOLOGIST

## 2017-09-27 PROCEDURE — 96101 PR PSYCHOLOGIC TESTING BY PSYCH/PHYS: CPT | Mod: 59,PBBFAC | Performed by: PSYCHOLOGIST

## 2017-09-27 PROCEDURE — 96102 PR PSYCHOLOGIC TESTING BY TECHNICIAN: CPT | Mod: 59,PBBFAC | Performed by: PSYCHOLOGIST

## 2017-09-27 PROCEDURE — 90791 PSYCH DIAGNOSTIC EVALUATION: CPT | Mod: PBBFAC | Performed by: PSYCHOLOGIST

## 2017-09-27 PROCEDURE — 96102 PR PSYCHOLOGIC TESTING BY TECHNICIAN: CPT | Mod: 59,S$PBB,, | Performed by: PSYCHOLOGIST

## 2017-09-27 PROCEDURE — 90791 PSYCH DIAGNOSTIC EVALUATION: CPT | Mod: S$PBB,,, | Performed by: PSYCHOLOGIST

## 2017-10-01 ENCOUNTER — TELEPHONE (OUTPATIENT)
Dept: PSYCHIATRY | Facility: CLINIC | Age: 52
End: 2017-10-01

## 2017-10-01 NOTE — PSYCH TESTING
[facility]  [address] [place]  [address]  [phone]    REPORT OF PSYCHOLOGICAL TESTING    NAME: Elba Rock   OC #: [id number]  : 1965    REFERRED BY:  Richmond Zafar M.D.    EVALUATED BY:  Atiya Nelson, Ph.D., Clinical Psychologist  Yi Castro, Psychometrician    DATES OF EVALUATION:  & 2017    EVALUATION PROCEDURES AND TIMES:  Conducted by Psychologist:  Interpretation and report of test data  Integration of information from diagnostic interview, medical record, and testing data  Conducted by Technician:  [place] Multiphasic Personality Inventory - 2 - Restructured Form (MMPI-2-RF)  [facility] Diagnostic (MBMD)  Tong Depression Inventory - II (BDI-II)  CPT Codes and Time:  41731 - 2 hours; 77470 - 2 hours    EVALUATION FINDINGS:  The diagnostic interview revealed that Mrs. Elba Rock is a 52 year-old white  female referred for Psychological Evaluation prior to surgical implantation of a spinal cord stimulator to address chronic pain. She reported she has chronic pain in her neck and lower back. She had surgery on her neck. She has tried medications, injections, RFA, and physical therapy without much relief from pain. Her activities are greatly limited. She is unable to mop, carry things, wash dishes, wash clothes, bathe normally, dance, or walk long distances. She is now considering a trial of the spinal cord stimulator device. She has some hesitance as she worries about what if? She has reviewed the educational materials.    Mrs. Rock has a significant psychiatric history. She reported she was diagnosed with severe depression, anxiety, and panic attacks at age 22. Her family doctor treated her with medication with good results. She then stopped the medication and became depressed/anxious and had to go back to the doctor. She became quite depression when she found out in  that her  was using meth  and crack, her marriage ended as a result, and her son went to [place]. She had another bout of depression/anxiety in 2011 after she lost her job while off her medication and was hospitalized on a psychiatric unit in [place] for several days. She reported that she has not been able to tolerate numerous medications, making treatment of her psychiatric illness difficult. Among the medications she was unable to tolerate are Ability, Risperdol, Lamictal, Wellbutrin, Buspar, Prozac, Paxil, Klonopin, and Zoloft. She has been treated by various physicians. She currently has a new PCP who recommended she see a psychiatrist and gave her three names of providers to contact. She is familiar with one of the providers and will not see that person but she indicated she will contact the two others to arrange psychiatric care. She currently takes Xanax and Elavil. She is also seeing a counselor Dr. Jerez monthly. She reports numerous current symptoms of depression, anxiety, and psychosis listed below. She denied suicidal ideation. She cried often during our interview. There is significant stress in her life with family conflict and poor living conditions. Her chronic depression and anxiety are clearly undertreated at this point.  She was pleasant and cooperative in interview but appeared depressed.        The medical record also revealed history of chronic pain disorder, failed back surgical syndrome, myalgia, facet arthritis of lumbar region, DDD, sacroiliac joint pain, foraminal stenosis of lumbosacral region, and cervical radiculopathy.  She also reported having Crohns disease, colitis, hiatal hernia, and heart disease.    TEST DATA:  Psychological Testing data revealed that she was fully cooperative and engaged in the assessment process. She completed her GED and 2 years of technical school in accounting. 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 She reported she is disabled due to psychiatric  illness. Effort on all tests was satisfactory to produce valid results.    The MMPI-2-RF profile validity scales raise concern about the impact of over-reporting on the validity of this profile. She generated a larger than average number of infrequent responses to test items. This level of infrequent responding may occur in individuals with genuine, severe psychological difficulties who report credible symptoms. This appears to be the case with Mrs. Rock. She also reported a much larger than average number of somatic symptoms rarely described by individuals with genuine medical conditions. In addition, she provided an unusual combination of responses that is associated with non-credible reporting of somatic and/or cognitive symptoms. This pattern of responding may occur in individuals with substantial medical problems who report credible symptoms, but it could also reflect exaggeration. With that caution noted, scores on the substantive scales indicate somatic and cognitive complaints, and emotional, thought, and interpersonal dysfunction. Somatic complaints include preoccupation with poor health, malaise, head pain, neurological symptoms, and gastrointestinal problems. Cognitive complaints include difficulties in memory and concentration. Emotional-internalizing findings include risk for suicidal ideation (which she denied in interview), demoralization, depression, helplessness and hopelessness, self-doubt, stress and worry, anxiety, fears, and anger. These findings are consistent with her history of significant depression and anxiety. Dysfunctional thinking includes ideas of persecution and aberrant perceptions and thoughts. In interview she acknowledged some paranoid ideation and possible auditory and visual hallucinations. Interpersonal difficulties include family problems, social avoidance, social anxiety, and cynicism. These elements were present in interview as well.    The [facility] suggested she was having  serious thoughts about suicide. Again, she denied this in interview. She reported relatively high levels of depression and anxiety. Feelings of being overwhelmed, unable to cope, and giving up may be pervasive. Her anxiety may cause her to be distrustful and fearful. She apparently thinks poorly of her abilities and may feel that she cannot participate in ordinary life activities. She appears generally adams and cranky, and her reaction to a physical illness is likely to be a mix of fear and depression. A sympathetic and supportive attitude should help maximize treatment progress. Test data suggested that she may benefit from pharmacologic or psychosocial intervention to address the psychological issues that could affect her adjustment to illness or recovery following major procedures such as surgery.     The BDI-II revealed the presence of moderate depression with a total score of 24 and highest scores in guilty feelings, punishment feelings, changes in appetite, and loss of interest in sex.     DIAGNOSTIC IMPRESSIONS:  Z01.818 Pre-op Exam  G89.4 Chronic pain disorder  M96.1 Failed back syndrome  F33.2 Major depression, recurrent, severe  F41.9 Anxiety    SUMMARY AND RECOMMENDATIONS:  Mrs. Rock reported a long and significant psychiatric history of depression, anxiety, and panic disorder. She reported numerous current symptoms of depression and anxiety. She also reported possible psychotic symptoms of auditory and visual hallucinations and paranoid ideation. She cried at several points in the interview. Test data revealed high elevations on all tests administered, suggesting the presence of significant depression and anxiety. She is not under the care of a psychiatrist at this time and is seeing a counselor once per week. Her psychiatric conditions are clearly quite undertreated. A complicating factor is her inability to tolerate many of the medications that are typically used for her symptoms. She is a POOR  candidate for the spinal cord stimulator from the psychological perspective due to her having significant chronic psychiatric illnesses which are clearly undertreated and difficult to treat due to side effects of numerous medications, with multiple current symptoms being reported.  I have discussed these finding with her. Recommend conservative treatment as opposed to spinal cord stimulator placement due to her emotional instability.        Report and interpretation and coding were completed on 10/1/2017.

## 2017-10-01 NOTE — TELEPHONE ENCOUNTER
Report of Psychological Evaluation is completed. It can be accessed through the Chart Review activity under the Notes tab (11th tab to the right of the Encounters tab).  It is titled Psych Testing.    She is a POOR CANDIDATE for the spinal cord stimulator from the psychological perspective due to her having significant chronic psychiatric illnesses which are clearly undertreated and difficult to treat due to side effects of numerous medications, with multiple current symptoms being reported.  I have discussed these finding with her. Recommend conservative treatment as opposed to spinal cord stimulator placement due to her emotional instability.  Thanks. EC

## 2017-10-01 NOTE — PROGRESS NOTES
Psychiatry Initial Visit (PhD/LCSW)    Date:   9/27/2017    CPT Code: 89970    Referred by:  Richmond Zafar M.D.    Chief complaint/reason for encounter:  Psychological Evaluation prior to surgical implantation of a spinal cord stimulator to address chronic pain    Clinical status of patient:  Outpatient    Met with:  Patient    History of present illness: Mrs. Elba Rock is a 52 year-old white  female referred for Psychological Evaluation prior to surgical implantation of a spinal cord stimulator to address chronic pain. She reported she has chronic pain in her neck and lower back. She had surgery on her neck. She has tried medications, injections, RFA, and physical therapy without much relief from pain. Her activities are greatly limited. She is unable to mop, carry things, wash dishes, wash clothes, bathe normally, dance, or walk long distances. She is now considering a trial of the spinal cord stimulator device. She has some hesitance as she worries about what if? She has reviewed the educational materials.    Mrs. Rock has a significant psychiatric history. She reported she was diagnosed with severe depression, anxiety, and panic attacks at age 22. Her family doctor treated her with medication with good results. She then stopped the medication and became depressed/anxious and had to go back to the doctor. She became quite depression when she found out in 2005 that her  was using meth and crack, her marriage ended as a result, and her son went to [place]. She had another bout of depression/anxiety in 2011 after she lost her job while off her medication and was hospitalized on a psychiatric unit in [place] for several days. She reported that she has not been able to tolerate numerous medications, making treatment of her psychiatric illness difficult. Among the medications she was unable to tolerate are Ability, Risperdol, Lamictal, Wellbutrin, Buspar, Prozac, Paxil, Klonopin, and  Zoloft. She has been treated by various physicians. She currently has a new PCP who recommended she see a psychiatrist and gave her three names of providers to contact. She is familiar with one of the providers and will not see that person but she indicated she will contact the two others to arrange psychiatric care. She currently takes Xanax and Elavil. She is also seeing a counselor Dr. Jerez monthly. She reports numerous current symptoms of depression, anxiety, and psychosis listed below. She denied suicidal ideation. She cried often during our interview. There is significant stress in her life with family conflict and poor living conditions. Her chronic depression and anxiety are clearly undertreated at this point.  She was pleasant and cooperative in interview but appeared depressed.      Pain scale:  back =3+, neck=3 ½     Symptoms:  Mood: depressed mood, diminished interest, thoughts of death, crying  Anxiety: excessive anxiety/worry, anxiety attacks, history of panic attacks  Thoughts: possibly hears and sees things that are not there, some paranoid ideation  Substance abuse: denied  Cognitive functioning: denied    Psychiatric history: as above    Medical history:   chronic pain disorder, failed back surgical syndrome, myalgia, facet arthritis of lumbar region, DDD, sacroiliac joint pain, foraminal stenosis of lumbosacral region, and cervical radiculopathy.  She also reported having Crohns disease, colitis, hiatal hernia, and heart disease.    Family history of psychiatric illness:  son with anxiety attacks, sister with depression and anxiety, uncle with manic depression and PTSD, nephew committed suicide, nieces with depression and anxiety    Social history (marriage, employment, etc.):    She completed her GED and 2 years of technical school in accounting. 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 Disabled due to psychiatric illness.  and  twice. One son from  each marriage. [place] undesirable neighbors. Hoahaoism. Enjoys her pet. Enjoys visiting with family and friends.    Substance use:   Alcohol: none   Drugs: currently smokes marijuana to decrease nausea and increase appetite - ½ joint per day   Tobacco: occasional cigarette   Caffeine: 1 cup coffee, 1 large tea per day    Strengths and Liabilities:   Strength:  Pt is expressive/articulate.    Liability:  Pt has chronic pain.       Mental Status Exam:  General appearance:  appears stated age, casually dressed, well groomed  Speech:  normal rate and tone  Level of cooperation:  cooperative  Thought processes:  logical, goal-directed  Mood:  depressed  Thought content:  Reports she may see things out of the corner of her. May have paranoia regarding neighbors. No active or passive homicidal thoughts, no active or passive suicidal ideation, no obsessions, no compulsions, no violence  Affect:  blunted  Orientation:  oriented to person, place, and time  Memory:  Recent memory:  3 of 3 objects after brief delay.    Remote memory - intact  Attention span and concentration:  spelled HOUSE forward and backwards  Fund of general knowledge: 4 of 4 recent presidents  Abstract reasoning:    Similarities: abstract.    Proverbs: abstract.  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  Report of Psychological Evaluation will follow in the Notes folder in the patients chart in the encounter titled Psychological Testing.  She will continue to see her therapist Dr. Jerez. She will make an appointment with a new psychiatrist recommended by her primary care physician.    Length of time:   75 minutes

## 2017-10-13 ENCOUNTER — TELEPHONE (OUTPATIENT)
Dept: PAIN MEDICINE | Facility: OTHER | Age: 52
End: 2017-10-13

## 2017-10-13 NOTE — TELEPHONE ENCOUNTER
She has been referred to [facility] by Dr. Zafar. Triage is complete. Patient appropriate for GAP.  Jaylin, can you call her Monday?  If not, I can.    Mandy, can you please add her to the spreadsheet?

## 2017-10-17 ENCOUNTER — TELEPHONE (OUTPATIENT)
Dept: PAIN MEDICINE | Facility: OTHER | Age: 52
End: 2017-10-17

## 2017-10-17 DIAGNOSIS — M54.12 CERVICAL RADICULOPATHY: ICD-10-CM

## 2017-10-17 DIAGNOSIS — G89.4 CHRONIC PAIN SYNDROME: ICD-10-CM

## 2017-10-17 DIAGNOSIS — M48.02 NEURAL FORAMINAL STENOSIS OF CERVICAL SPINE: ICD-10-CM

## 2017-10-17 DIAGNOSIS — M47.899 FACET SYNDROME: ICD-10-CM

## 2017-10-17 DIAGNOSIS — M53.3 SACROILIAC JOINT PAIN: Primary | ICD-10-CM

## 2017-10-17 DIAGNOSIS — M48.07 FORAMINAL STENOSIS OF LUMBOSACRAL REGION: ICD-10-CM

## 2017-10-17 DIAGNOSIS — M79.10 MYALGIA: ICD-10-CM

## 2017-10-17 DIAGNOSIS — R53.81 PHYSICAL DECONDITIONING: ICD-10-CM

## 2017-10-17 DIAGNOSIS — F33.9 RECURRENT MAJOR DEPRESSIVE DISORDER, REMISSION STATUS UNSPECIFIED: ICD-10-CM

## 2017-10-17 DIAGNOSIS — G57.01 PIRIFORMIS SYNDROME OF RIGHT SIDE: ICD-10-CM

## 2017-10-17 NOTE — TELEPHONE ENCOUNTER
Called pt to discuss referral to [facility]. She is interested but states she lives in [place]. Advised her that there is a hotel here for $85/night, pt states she is on a fixed income. States she cares for her brother and her mother but is interested in the program. This writer did not mention financial assistance for the hotel. Advised pt that Mandy would reach out to her to schedule her GAP, pt stated she would like to come in next week. Will have Mandy reach out on number in chart.

## 2017-10-17 NOTE — TELEPHONE ENCOUNTER
----- Message from Mandy Kohler sent at 10/17/2017  3:34 PM CDT -----  Please place a referral for [facility]

## 2017-10-25 ENCOUNTER — CLINICAL SUPPORT (OUTPATIENT)
Dept: REHABILITATION | Facility: OTHER | Age: 52
End: 2017-10-25
Payer: MEDICARE

## 2017-10-25 DIAGNOSIS — R53.81 PHYSICAL DECONDITIONING: ICD-10-CM

## 2017-10-25 DIAGNOSIS — G89.4 CHRONIC PAIN SYNDROME: Primary | ICD-10-CM

## 2017-10-25 DIAGNOSIS — M79.10 MYALGIA: ICD-10-CM

## 2017-10-25 PROCEDURE — G8988 SELF CARE GOAL STATUS: HCPCS | Mod: CI

## 2017-10-25 PROCEDURE — 97166 OT EVAL MOD COMPLEX 45 MIN: CPT

## 2017-10-25 PROCEDURE — G8987 SELF CARE CURRENT STATUS: HCPCS | Mod: CJ

## 2017-10-25 PROCEDURE — 97530 THERAPEUTIC ACTIVITIES: CPT

## 2017-10-25 NOTE — PROGRESS NOTES
"OT Evaluation &  Goals and Physical Capacity for Functional Restoration Program    Name: Elba Rock  MRN:[id number]  Physician: No ref. provider found    Diagnosis: chronic pain syndrome  Onset date: s/p ACDF 2002, pain management since appro 2013 following incident of pushing object    Date of service: 10/25/2017  Start time: 1100  End time: 1245  Total time: 75 mins    Subjective     Statement: "Medication is just a band-aid, that's how I see it."   "When it starts that "throbbing," I can't handle it. There's so much pressure, I want to pull my head off."   "I just want to be pain free."   "I got up with an attitude of I don't care anymore."     Pain: 2/10 at rest. Pain established using the Numbers pain scale.   Pain location: lower back and right hip   Pain description: "It's like a toothache, I think it's the weather out there."    Personal Functional Three Month Goals:   Vocational: "Take my time and asses what I have to do and get idea on best way to proceed."    Recreational : "Be cautious I am doing."    Daily Living Activities: "I would like to be able to be ab;e to function as much as possible. Need to watch how I assert myself. Be more cautious when doing certain things. Access what needs to be done and see the best way to go about things or just do it and be careful"        History     Medical History:   PMH:   Past Medical History:   Diagnosis Date    Allergy     Anxiety     Arthritis     Coronary artery disease     Degenerative disc disease     Depression     Diaphragmatic hernia without mention of obstruction or gangrene 12/12/14    Hiatal hernia    Diverticulosis of colon (without mention of hemorrhage) 12/11/14    Diverticulosis    GERD (gastroesophageal reflux disease)     Glaucoma     Hyperlipidemia     Regional enteritis of unspecified site 12/11/14    Crohn's disease    Regional enteritis of unspecified site 12/11/14    Crohn's disease    Substance abuse     [facility] for " "gastroparesis    Ulcer     Ulcerative colitis, unspecified 14    Ulcerative colitis      Past Surgical History:   Procedure Laterality Date    ADENOIDECTOMY      Cervical RFA  2017    CHOLECYSTECTOMY      COLONOSCOPY  2017    EYE SURGERY      HYSTERECTOMY      NASAL SEPTUM SURGERY      SPINE SURGERY      TONSILLECTOMY      UPPER ENDOSCOPY W/ ESOPHAGEAL MANOMETRY  2017       Recent or major surgery: no surgery plans,   MRI: 2016 stenosis C5/6 noted     Worse: Bending   Standing    Better:  Sitting, but does need to weight shift    On the move    Can tolerate:   Sitting: awhile, "then I get to get up," 45- 1 hr mins, getting up once during movie   Standin mins, at most, depending  Walking: not walking regularly    Coping Strategies: thera-cane, self-message, TENs, heating pad, episome salt bath, topical cream  Task modifications: Describes stopping for position change/stretching    Social and ADL History:  Activities of Daily Living Checklist:   Key to Score:   0: Unable to do the activity  1: Can do the activity with great difficulty  2: Can do the activity with little difficulty   3: No problem with activity  N/A: This is not an activity I do  H/T: Have not tried yet    Personal Care:  Homemaking:     Dressing Upper Body:  2 Meal preparation: 3   Dressing Lower Body:  1 Kitchen Cleanup: 3   Bathin Childcare:  3   Hair Care:  1 Grocery shopping: 3   Sleep:  2 Vacuuming/moppin     Emptying trash/ garbage ba   General Mobility:   Bed making changin   Sittin Laundry  2   Bendin     Getting in/out of bed:  2 Home Maintenance:    Standin Mowing, rakin   Walkin Gardenin   Twistin Home Repairs:  0   Liftin Paintin         Getting around:       Getting in and out of car:  2     Buckling up you seat belt:  3     Opening heavy doors:  1     Climbing/descending stairs:  2               Social Hx: lives alone in [place], [place]; " "previously accounting work   "work on doctors, 2 appointments yesterday;"   Home access: trailer, 4 GIOVANNI, R side up   Family dynamic: has a dog, family (2 sons) in [facility],   baby-sitting grandchildren (12, 10, 8, 2.5, 1.5 yr/lo, and 3 mon) approx 2x/wk  DME: none  Driving status: Independent   Exercise: "I need to get back to walking, I need motivation really."       Objective     COGNITIVE Exam  Oriented: Person, Place, Time and Situation  Behaviors: Follows multistep  commands  Follows Commands/attention: Follows multistep  commands  Communication: clear/fluent  Memory: No Deficits noted    PHYSICAL Exam  Resting Blood Pressure: 102/ 66  Heart rate: 61  SpO2: 98    ROM assessment:   AROM:  Shoulder WFL  Elbow WFL  Hand WFL  Dominant hand: left    Lumber Spine     Forward bend  7 inches   Back bend  Observed       Cervical Spine     Flexion  Limited, "you know I can't hold a pillow to put a pillow case o, after all these years."    Extension Limited    Rot. Right  WFL   Rot. Left WFL    Lateral flexion right  WFL    Lateral flexion left  WFL      Strengthen Assessment:   BUE/LE: WLF    Sensation: Shweta reports decreased sensation in Lhand, 4th/5th digits  Light touch: intact     Endurance Testing: Modified Ramp Treadmill Test  Minutes Completed   3 mins 15secs   % Grades  10 %   Speed (mph)  1.7 mph   METS 4   HR 83 bpm   Jack Rating Perceived Exertion Scale   3   Reason for stop point: Pt reported R hip pain, "It it wasn't for this hip I'd still be on the treadmill," Used bars throughout testing.      Functional Strength Test:  PILE Testing  Lbs/ HR    Floor to Waist   28 lbs/ 79 bpm   Waist to Shoulder   18 lbs/ 70 bpm    Reason for Stop point: Increased time required for completing repetitions. During physical testing, participation level consistent.     No environmental, cultural, spiritual, developmental or education needs expressed or noted    Treatment     Ms. Rock received education " "regarding proper posture and body mechanics.  Patient received a handout regarding anticipated muscular soreness following lift testing and strategies for management were reviewed with patient including stretching, using ice, scheduled rest, and z-lie. Patient received education on the Functional Restoration Program. Details of the program were discussed.  Discussed importance of specific, objective goals and need for continued reflection on personal goals. Pt explained throughout assessment goal of program to add in education pt and assisting pt to reach her functional goals.       Assessment       Ms. Rock presented with good mood. Continuously reported decreased ability this day 2/2 "the weather." Pt with limitations of distance and cost for participation in program. Pt with unreadiness to change behaviors, but does acknowledge personal limitations. Pt easily redirectable, but struggled with ability to come up with personal goals. Pt with small gap regarding desired outcomes and physical ability. Pt is unable to fully participate in work, recreational, and home-based activities because of decreased functional  strength, decreased  AROM, decreased endurance, limited positional tolerance, fear of re-injury, and fear of increased pain. She will benefit from participating in a conditioning  program designed  to increase functional strength, flexibility, and endurance in order to meet functional goals.     Medical necessity is demonstrated by the following problem list.   History Examination Decision Making Complexity Score   Occupational Profile:     Medical and Therapy History:       Pt required  Expanded review of medical history and occupational profile.   Performance Performance Deficits   Decreased IADL (house management)  performance    Physical  Decreased ROM, strength, endurance     Cognitive        Psychosocial:    Pain avoidance, decreased social participation   Pt required medium analytical " "complexity 2/2 occupational profile factors.    Assessment required comprehensive review, limited treatment opinions, and co-morbidities include: myalgia, crohn's disease that affects occupational performance. Minimal modifications or assistance required for completion of assessment.     Pt presented with moderate complexity OT evaluation.          FRP Goals: While working towards the long-term (3 month) functional goals listed above, Ms. Rock will accomplish the following short term goals during the 3-week intensive rehabilitation program. Ms. Rock will:     1. Develop a viable return to work plan.   2. Demonstrate physical capacities consistent with a Light-medium work demand level.   3. Demonstrate increased functional strength by lifting 35 floor to waist  and 25 waist to shoulder in order to meet demand of work/home routine.   4. Increase her positional tolerance to the level needed for work and home activities.   5. Implement self-care strategies during the program and at home to control pain.    6.   Pt will demonstrate use of mindfulness, stress management, and coping strategies for improved participation in daily routine.          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 Used to walk 5 miles a day (10 years ago)."      Daily Living:  Bathing dog, (14 lbs), "the bending gets to me," currently someone does for her     House maintenance:   Sweeping, mopping, vacuuming, sporadically now, desires weekly   Dusting bi-monthly    Washing her " "car "by hand"     Cutting my grass with self-propelled mower, "I get to over-heated and sick."     Grocery shopping, lifting a case of water (24 lbs)     Childcare, babysitting for longer period, bringing them outdoors and "to the show"     Light- medium Physical Demand Level    The PDL listed above is based on todays physical performance screening test. More comprehensive physical  testing integrated with clinical findings would be required to derived an accurate work capacity.     Outcomes:  G CODE TOOL: Neck disability Index    Category:  Current Score  = 15/50  or 30% impaired, limited or restricted (CJ)  Goal at Discharge Score = 20% impaired (CI)    Score interpretation is as follows:     TEST SCORE  Modifier  Impairment Limitation Restriction    0/50  CH  0 % impaired, limited or restricted   1-9/50  CI  @ least 1% but less than 20% impaired, limited or restricted   10-19/50  CJ  @ least 20%<40% impaired, limited or restricted   20-29/50  CK  @ least 40%<60% impaired, limited or restricted   30-39/50  CL  @ least 60% <80% impaired, limited or restricted   40-49/50  CM  @ least 80%<100% impaired limited or restricted   50/50  CN  100% impaired, limited or restricted           Plan     Outpatient occupational therapy, 2-3 x/wk, for 3 weeks or or 12 visits to include:     1. Functional conditioning 2 times daily to increase physical capacity and allow Ms. Rock to meet demands of vocation and home.   2. Individual counseling to develop return to work/daily routine plan and better understand the return to work process and/or daily routine restructure.   3. Daily Stretching, aerobic conditioning and walking to increase functional tolerance and allow Ms. Rock to meet demands of work and home.   4. Functional activities to increase ability to move fluidly and quickly and tolerate unguarded movements.   5. Re-education regarding proper postural, body mechanics, and coping strategies for healthy roles and " routine for managing daily stressors.    6. Any other treatment deemed necessary for patient to achieve personally driven goals to promote positive health and wellness and daily roles and routines    JESSICA Galvin, 10/25/2017

## 2017-10-26 NOTE — PLAN OF CARE
"OT Evaluation &  Goals and Physical Capacity for Functional Restoration Program    Name: Elba Rock  MRN:[id number]  Physician: No ref. provider found    Diagnosis: chronic pain syndrome  Onset date: s/p ACDF 2002, pain management since appro 2013 following incident of pushing object    Date of service: 10/25/2017  Start time: 1100  End time: 1245  Total time: 75 mins    Subjective     Statement: "Medication is just a band-aid, that's how I see it."   "When it starts that "throbbing," I can't handle it. There's so much pressure, I want to pull my head off."   "I just want to be pain free."   "I got up with an attitude of I don't care anymore."     Pain: 2/10 at rest. Pain established using the Numbers pain scale.   Pain location: lower back and right hip   Pain description: "It's like a toothache, I think it's the weather out there."    Personal Functional Three Month Goals:   Vocational: "Take my time and asses what I have to do and get idea on best way to proceed."    Recreational : "Be cautious I am doing."    Daily Living Activities: "I would like to be able to be ab;e to function as much as possible. Need to watch how I assert myself. Be more cautious when doing certain things. Access what needs to be done and see the best way to go about things or just do it and be careful"        History     Medical History:   PMH:   Past Medical History:   Diagnosis Date    Allergy     Anxiety     Arthritis     Coronary artery disease     Degenerative disc disease     Depression     Diaphragmatic hernia without mention of obstruction or gangrene 12/12/14    Hiatal hernia    Diverticulosis of colon (without mention of hemorrhage) 12/11/14    Diverticulosis    GERD (gastroesophageal reflux disease)     Glaucoma     Hyperlipidemia     Regional enteritis of unspecified site 12/11/14    Crohn's disease    Regional enteritis of unspecified site 12/11/14    Crohn's disease    Substance abuse     [facility] for " "gastroparesis    Ulcer     Ulcerative colitis, unspecified 14    Ulcerative colitis      Past Surgical History:   Procedure Laterality Date    ADENOIDECTOMY      Cervical RFA  2017    CHOLECYSTECTOMY      COLONOSCOPY  2017    EYE SURGERY      HYSTERECTOMY      NASAL SEPTUM SURGERY      SPINE SURGERY      TONSILLECTOMY      UPPER ENDOSCOPY W/ ESOPHAGEAL MANOMETRY  2017       Recent or major surgery: no surgery plans,   MRI: 2016 stenosis C5/6 noted     Worse: Bending   Standing    Better:  Sitting, but does need to weight shift    On the move    Can tolerate:   Sitting: awhile, "then I get to get up," 45- 1 hr mins, getting up once during movie   Standin mins, at most, depending  Walking: not walking regularly    Coping Strategies: thera-cane, self-message, TENs, heating pad, episome salt bath, topical cream  Task modifications: Describes stopping for position change/stretching    Social and ADL History:  Activities of Daily Living Checklist:   Key to Score:   0: Unable to do the activity  1: Can do the activity with great difficulty  2: Can do the activity with little difficulty   3: No problem with activity  N/A: This is not an activity I do  H/T: Have not tried yet    Personal Care:  Homemaking:     Dressing Upper Body:  2 Meal preparation: 3   Dressing Lower Body:  1 Kitchen Cleanup: 3   Bathin Childcare:  3   Hair Care:  1 Grocery shopping: 3   Sleep:  2 Vacuuming/moppin     Emptying trash/ garbage ba   General Mobility:   Bed making changin   Sittin Laundry  2   Bendin     Getting in/out of bed:  2 Home Maintenance:    Standin Mowing, rakin   Walkin Gardenin   Twistin Home Repairs:  0   Liftin Paintin         Getting around:       Getting in and out of car:  2     Buckling up you seat belt:  3     Opening heavy doors:  1     Climbing/descending stairs:  2               Social Hx: lives alone in [place], [place]; " "previously accounting work   "work on doctors, 2 appointments yesterday;"   Home access: trailer, 4 [address], R side up   Family dynamic: has a dog, family (2 sons) in [facility],   baby-sitting grandchildren (12, 10, 8, 2.5, 1.5 yr/lo, and 3 mon) approx 2x/wk  DME: none  Driving status: Independent   Exercise: "I need to get back to walking, I need motivation really."       Objective     COGNITIVE Exam  Oriented: Person, Place, Time and Situation  Behaviors: Follows multistep  commands  Follows Commands/attention: Follows multistep  commands  Communication: clear/fluent  Memory: No Deficits noted    PHYSICAL Exam  Resting Blood Pressure: 102/ 66  Heart rate: 61  SpO2: 98    ROM assessment:   AROM:  Shoulder WFL  Elbow WFL  Hand WFL  Dominant hand: left    Lumber Spine     Forward bend  7 inches   Back bend  Observed       Cervical Spine     Flexion  Limited, "you know I can't hold a pillow to put a pillow case o, after all these years."    Extension Limited    Rot. Right  WFL   Rot. Left WFL    Lateral flexion right  WFL    Lateral flexion left  WFL      Strengthen Assessment:   BUE/LE: WLF    Sensation: Shweta reports decreased sensation in Lhand, 4th/5th digits  Light touch: intact     Endurance Testing: Modified Ramp Treadmill Test  Minutes Completed   3 mins 15secs   % Grades  10 %   Speed (mph)  1.7 mph   METS 4   HR 83 bpm   Jack Rating Perceived Exertion Scale   3   Reason for stop point: Pt reported R hip pain, "It it wasn't for this hip I'd still be on the treadmill," Used bars throughout testing.      Functional Strength Test:  PILE Testing  Lbs/ HR    Floor to Waist   28 lbs/ 79 bpm   Waist to Shoulder   18 lbs/ 70 bpm    Reason for Stop point: Increased time required for completing repetitions. During physical testing, participation level consistent.     No environmental, cultural, spiritual, developmental or education needs expressed or noted    Treatment     Ms. Rock received education " "regarding proper posture and body mechanics.  Patient received a handout regarding anticipated muscular soreness following lift testing and strategies for management were reviewed with patient including stretching, using ice, scheduled rest, and z-lie. Patient received education on the Functional Restoration Program. Details of the program were discussed.  Discussed importance of specific, objective goals and need for continued reflection on personal goals. Pt explained throughout assessment goal of program to add in education pt and assisting pt to reach her functional goals.       Assessment       Ms. Rock presented with good mood. Continuously reported decreased ability this day 2/2 "the weather." Pt with limitations of distance and cost for participation in program. Pt with unreadiness to change behaviors, but does acknowledge personal limitations. Pt easily redirectable, but struggled with ability to come up with personal goals. Pt with small gap regarding desired outcomes and physical ability. Pt is unable to fully participate in work, recreational, and home-based activities because of decreased functional  strength, decreased  AROM, decreased endurance, limited positional tolerance, fear of re-injury, and fear of increased pain. She will benefit from participating in a conditioning  program designed  to increase functional strength, flexibility, and endurance in order to meet functional goals.     Medical necessity is demonstrated by the following problem list.   History Examination Decision Making Complexity Score   Occupational Profile:     Medical and Therapy History:       Pt required  Expanded review of medical history and occupational profile.   Performance Performance Deficits   Decreased IADL (house management)  performance    Physical  Decreased ROM, strength, endurance     Cognitive        Psychosocial:    Pain avoidance, decreased social participation   Pt required medium analytical " "complexity 2/2 occupational profile factors.    Assessment required comprehensive review, limited treatment opinions, and co-morbidities include: myalgia, crohn's disease that affects occupational performance. Minimal modifications or assistance required for completion of assessment.     Pt presented with moderate complexity OT evaluation.          FRP Goals: While working towards the long-term (3 month) functional goals listed above, Ms. Rock will accomplish the following short term goals during the 3-week intensive rehabilitation program. Ms. Rock will:     1. Develop a viable return to work plan.   2. Demonstrate physical capacities consistent with a Light-medium work demand level.   3. Demonstrate increased functional strength by lifting 35 floor to waist  and 25 waist to shoulder in order to meet demand of work/home routine.   4. Increase her positional tolerance to the level needed for work and home activities.   5. Implement self-care strategies during the program and at home to control pain.    6.   Pt will demonstrate use of mindfulness, stress management, and coping strategies for improved participation in daily routine.          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 Used to walk 5 miles a day (10 years ago)."      Daily Living:  Bathing dog, (14 lbs), "the bending gets to me," currently someone does for her     House maintenance:   Sweeping, mopping, vacuuming, sporadically now, desires weekly   Dusting bi-monthly    Washing her " "car "by hand"     Cutting my grass with self-propelled mower, "I get to over-heated and sick."     Grocery shopping, lifting a case of water (24 lbs)     Childcare, babysitting for longer period, bringing them outdoors and "to the show"     Light- medium Physical Demand Level    The PDL listed above is based on todays physical performance screening test. More comprehensive physical  testing integrated with clinical findings would be required to derived an accurate work capacity.     Outcomes:  G CODE TOOL: Neck disability Index    Category:  Current Score  = 15/50  or 30% impaired, limited or restricted (CJ)  Goal at Discharge Score = 20% impaired (CI)    Score interpretation is as follows:     TEST SCORE  Modifier  Impairment Limitation Restriction    0/50  CH  0 % impaired, limited or restricted   1-9/50  CI  @ least 1% but less than 20% impaired, limited or restricted   10-19/50  CJ  @ least 20%<40% impaired, limited or restricted   20-29/50  CK  @ least 40%<60% impaired, limited or restricted   30-39/50  CL  @ least 60% <80% impaired, limited or restricted   40-49/50  CM  @ least 80%<100% impaired limited or restricted   50/50  CN  100% impaired, limited or restricted           Plan     Outpatient occupational therapy, 2-3 x/wk, for 3 weeks or or 12 visits to include:     1. Functional conditioning 2 times daily to increase physical capacity and allow Ms. Rock to meet demands of vocation and home.   2. Individual counseling to develop return to work/daily routine plan and better understand the return to work process and/or daily routine restructure.   3. Daily Stretching, aerobic conditioning and walking to increase functional tolerance and allow Ms. Rock to meet demands of work and home.   4. Functional activities to increase ability to move fluidly and quickly and tolerate unguarded movements.   5. Re-education regarding proper postural, body mechanics, and coping strategies for healthy roles and " routine for managing daily stressors.    6. Any other treatment deemed necessary for patient to achieve personally driven goals to promote positive health and wellness and daily roles and routines    JESSICA Galvin, 10/25/2017

## 2017-11-06 ENCOUNTER — TELEPHONE (OUTPATIENT)
Dept: PAIN MEDICINE | Facility: CLINIC | Age: 52
End: 2017-11-06

## 2017-11-06 NOTE — TELEPHONE ENCOUNTER
pt called back explained tht she will love to do the program but she can not drive 2 hrs a day to get here and back. Ask if she can afford to stay in a hotel and that it is $89.00 pt said she can not afford

## 2017-11-06 NOTE — TELEPHONE ENCOUNTER
Try contacting pt regarding Dr Kavin BAUTISTA program n/a lft messg. Mailing out welcome packet today.

## 2018-01-12 ENCOUNTER — TELEPHONE (OUTPATIENT)
Dept: PAIN MEDICINE | Facility: OTHER | Age: 53
End: 2018-01-12

## 2018-01-12 NOTE — TELEPHONE ENCOUNTER
pk with pt she said she is not financially stable to do this program she will have to declined for now.

## 2018-12-04 PROBLEM — H26.9 CATARACT: Status: ACTIVE | Noted: 2018-12-04

## 2018-12-05 PROBLEM — Z98.890 POSTOPERATIVE EYE STATE: Status: ACTIVE | Noted: 2018-12-05

## 2020-09-21 ENCOUNTER — LAB VISIT (OUTPATIENT)
Dept: LAB | Facility: HOSPITAL | Age: 55
End: 2020-09-21
Attending: INTERNAL MEDICINE
Payer: MEDICARE

## 2020-09-21 DIAGNOSIS — K50.00 REGIONAL ENTERITIS OF SMALL INTESTINE: ICD-10-CM

## 2020-09-21 DIAGNOSIS — Z79.899 ENCOUNTER FOR LONG-TERM (CURRENT) USE OF OTHER MEDICATIONS: Primary | ICD-10-CM

## 2020-09-21 LAB
ALBUMIN SERPL BCP-MCNC: 4 G/DL (ref 3.5–5.2)
ALP SERPL-CCNC: 89 U/L (ref 55–135)
ALT SERPL W/O P-5'-P-CCNC: 20 U/L (ref 10–44)
ANION GAP SERPL CALC-SCNC: 4 MMOL/L (ref 8–16)
AST SERPL-CCNC: 16 U/L (ref 10–40)
BACTERIA #/AREA URNS HPF: NEGATIVE /HPF
BASOPHILS # BLD AUTO: 0.07 K/UL (ref 0–0.2)
BASOPHILS NFR BLD: 1 % (ref 0–1.9)
BILIRUB SERPL-MCNC: 0.5 MG/DL (ref 0.1–1)
BILIRUB UR QL STRIP: NEGATIVE
BUN SERPL-MCNC: 15 MG/DL (ref 6–20)
CALCIUM SERPL-MCNC: 9.8 MG/DL (ref 8.7–10.5)
CHLORIDE SERPL-SCNC: 104 MMOL/L (ref 95–110)
CLARITY UR: CLEAR
CO2 SERPL-SCNC: 30 MMOL/L (ref 23–29)
COLOR UR: YELLOW
CREAT SERPL-MCNC: 0.8 MG/DL (ref 0.5–1.4)
DIFFERENTIAL METHOD: ABNORMAL
EOSINOPHIL # BLD AUTO: 0.3 K/UL (ref 0–0.5)
EOSINOPHIL NFR BLD: 4.5 % (ref 0–8)
ERYTHROCYTE [DISTWIDTH] IN BLOOD BY AUTOMATED COUNT: 11.6 % (ref 11.5–14.5)
EST. GFR  (AFRICAN AMERICAN): >60 ML/MIN/1.73 M^2
EST. GFR  (NON AFRICAN AMERICAN): >60 ML/MIN/1.73 M^2
GLUCOSE SERPL-MCNC: 94 MG/DL (ref 70–110)
GLUCOSE UR QL STRIP: NEGATIVE
HCT VFR BLD AUTO: 44.5 % (ref 37–48.5)
HGB BLD-MCNC: 15.2 G/DL (ref 12–16)
HGB UR QL STRIP: NEGATIVE
HYALINE CASTS #/AREA URNS LPF: 0 /LPF
IMM GRANULOCYTES # BLD AUTO: 0.01 K/UL (ref 0–0.04)
IMM GRANULOCYTES NFR BLD AUTO: 0.1 % (ref 0–0.5)
KETONES UR QL STRIP: NEGATIVE
LEUKOCYTE ESTERASE UR QL STRIP: ABNORMAL
LYMPHOCYTES # BLD AUTO: 2.2 K/UL (ref 1–4.8)
LYMPHOCYTES NFR BLD: 30.7 % (ref 18–48)
MCH RBC QN AUTO: 33.3 PG (ref 27–31)
MCHC RBC AUTO-ENTMCNC: 34.2 G/DL (ref 32–36)
MCV RBC AUTO: 98 FL (ref 82–98)
MICROSCOPIC COMMENT: NORMAL
MONOCYTES # BLD AUTO: 0.7 K/UL (ref 0.3–1)
MONOCYTES NFR BLD: 9.2 % (ref 4–15)
NEUTROPHILS # BLD AUTO: 4 K/UL (ref 1.8–7.7)
NEUTROPHILS NFR BLD: 54.5 % (ref 38–73)
NITRITE UR QL STRIP: NEGATIVE
NRBC BLD-RTO: 0 /100 WBC
PH UR STRIP: 8 [PH] (ref 5–8)
PLATELET # BLD AUTO: 245 K/UL (ref 150–350)
PMV BLD AUTO: 10.8 FL (ref 9.2–12.9)
POTASSIUM SERPL-SCNC: 3.9 MMOL/L (ref 3.5–5.1)
PROT SERPL-MCNC: 8.3 G/DL (ref 6–8.4)
PROT UR QL STRIP: NEGATIVE
RBC # BLD AUTO: 4.56 M/UL (ref 4–5.4)
RBC #/AREA URNS HPF: 1 /HPF (ref 0–4)
SODIUM SERPL-SCNC: 138 MMOL/L (ref 136–145)
SP GR UR STRIP: 1.01 (ref 1–1.03)
SQUAMOUS #/AREA URNS HPF: 2 /HPF
URN SPEC COLLECT METH UR: ABNORMAL
UROBILINOGEN UR STRIP-ACNC: NEGATIVE EU/DL
VIT B12 SERPL-MCNC: 460 PG/ML (ref 210–950)
WBC # BLD AUTO: 7.3 K/UL (ref 3.9–12.7)
WBC #/AREA URNS HPF: 1 /HPF (ref 0–5)

## 2020-09-21 PROCEDURE — 81000 URINALYSIS NONAUTO W/SCOPE: CPT

## 2020-09-21 PROCEDURE — 82607 VITAMIN B-12: CPT

## 2020-09-21 PROCEDURE — 85025 COMPLETE CBC W/AUTO DIFF WBC: CPT

## 2020-09-21 PROCEDURE — 36415 COLL VENOUS BLD VENIPUNCTURE: CPT

## 2020-09-21 PROCEDURE — 80053 COMPREHEN METABOLIC PANEL: CPT

## 2020-12-14 DIAGNOSIS — Z12.31 ENCOUNTER FOR SCREENING MAMMOGRAM FOR MALIGNANT NEOPLASM OF BREAST: Primary | ICD-10-CM

## 2020-12-14 DIAGNOSIS — Z13.820 ENCOUNTER FOR SCREENING FOR OSTEOPOROSIS: Primary | ICD-10-CM

## 2020-12-14 DIAGNOSIS — M81.0 AGE-RELATED OSTEOPOROSIS WITHOUT CURRENT PATHOLOGICAL FRACTURE: ICD-10-CM

## 2021-01-04 ENCOUNTER — HOSPITAL ENCOUNTER (OUTPATIENT)
Dept: RADIOLOGY | Facility: HOSPITAL | Age: 56
Discharge: HOME | End: 2021-01-04
Attending: INTERNAL MEDICINE
Payer: MEDICARE

## 2021-01-04 VITALS — WEIGHT: 143 LBS | BODY MASS INDEX: 25.34 KG/M2 | HEIGHT: 63 IN

## 2021-01-04 DIAGNOSIS — M81.0 AGE-RELATED OSTEOPOROSIS WITHOUT CURRENT PATHOLOGICAL FRACTURE: ICD-10-CM

## 2021-01-04 DIAGNOSIS — Z13.820 ENCOUNTER FOR SCREENING FOR OSTEOPOROSIS: ICD-10-CM

## 2021-01-04 DIAGNOSIS — Z12.31 ENCOUNTER FOR SCREENING MAMMOGRAM FOR MALIGNANT NEOPLASM OF BREAST: ICD-10-CM

## 2021-01-04 PROCEDURE — 77067 SCR MAMMO BI INCL CAD: CPT | Mod: TC

## 2021-01-04 PROCEDURE — 77080 DXA BONE DENSITY AXIAL: CPT | Mod: TC

## 2021-12-06 PROBLEM — H26.9 CATARACT: Status: RESOLVED | Noted: 2018-12-04 | Resolved: 2021-12-06

## 2022-01-13 DIAGNOSIS — Z12.31 VISIT FOR SCREENING MAMMOGRAM: Primary | ICD-10-CM

## 2022-02-03 ENCOUNTER — HOSPITAL ENCOUNTER (OUTPATIENT)
Dept: RADIOLOGY | Facility: HOSPITAL | Age: 57
Discharge: HOME | End: 2022-02-03
Attending: INTERNAL MEDICINE
Payer: MEDICARE

## 2022-02-03 VITALS — BODY MASS INDEX: 25.34 KG/M2 | HEIGHT: 63 IN | WEIGHT: 143 LBS

## 2022-02-03 DIAGNOSIS — Z12.31 VISIT FOR SCREENING MAMMOGRAM: ICD-10-CM

## 2022-02-03 PROCEDURE — 77067 SCR MAMMO BI INCL CAD: CPT | Mod: TC

## 2022-03-07 ENCOUNTER — LAB VISIT (OUTPATIENT)
Dept: LAB | Facility: HOSPITAL | Age: 57
End: 2022-03-07
Attending: INTERNAL MEDICINE
Payer: MEDICARE

## 2022-03-07 DIAGNOSIS — K50.00 REGIONAL ENTERITIS OF SMALL INTESTINE: Primary | ICD-10-CM

## 2022-03-07 DIAGNOSIS — Z79.899 ENCOUNTER FOR LONG-TERM (CURRENT) USE OF OTHER MEDICATIONS: ICD-10-CM

## 2022-03-07 LAB
ALBUMIN SERPL BCP-MCNC: 4.2 G/DL (ref 3.5–5.2)
ALP SERPL-CCNC: 86 U/L (ref 55–135)
ALT SERPL W/O P-5'-P-CCNC: 29 U/L (ref 10–44)
ANION GAP SERPL CALC-SCNC: 2 MMOL/L (ref 8–16)
AST SERPL-CCNC: 14 U/L (ref 10–40)
BACTERIA #/AREA URNS HPF: NEGATIVE /HPF
BASOPHILS # BLD AUTO: 0.06 K/UL (ref 0–0.2)
BASOPHILS NFR BLD: 0.6 % (ref 0–1.9)
BILIRUB SERPL-MCNC: 0.4 MG/DL (ref 0.1–1)
BILIRUB UR QL STRIP: NEGATIVE
BUN SERPL-MCNC: 9 MG/DL (ref 6–20)
CALCIUM SERPL-MCNC: 9.6 MG/DL (ref 8.7–10.5)
CHLORIDE SERPL-SCNC: 106 MMOL/L (ref 95–110)
CLARITY UR: CLEAR
CO2 SERPL-SCNC: 32 MMOL/L (ref 23–29)
COLOR UR: YELLOW
CREAT SERPL-MCNC: 0.7 MG/DL (ref 0.5–1.4)
DIFFERENTIAL METHOD: ABNORMAL
EOSINOPHIL # BLD AUTO: 0.2 K/UL (ref 0–0.5)
EOSINOPHIL NFR BLD: 2.2 % (ref 0–8)
ERYTHROCYTE [DISTWIDTH] IN BLOOD BY AUTOMATED COUNT: 11.9 % (ref 11.5–14.5)
EST. GFR  (AFRICAN AMERICAN): >60 ML/MIN/1.73 M^2
EST. GFR  (NON AFRICAN AMERICAN): >60 ML/MIN/1.73 M^2
GLUCOSE SERPL-MCNC: 98 MG/DL (ref 70–110)
GLUCOSE UR QL STRIP: NEGATIVE
HCT VFR BLD AUTO: 42.9 % (ref 37–48.5)
HGB BLD-MCNC: 14.6 G/DL (ref 12–16)
HGB UR QL STRIP: NEGATIVE
HYALINE CASTS #/AREA URNS LPF: 0 /LPF
IMM GRANULOCYTES # BLD AUTO: 0.03 K/UL (ref 0–0.04)
IMM GRANULOCYTES NFR BLD AUTO: 0.3 % (ref 0–0.5)
KETONES UR QL STRIP: NEGATIVE
LEUKOCYTE ESTERASE UR QL STRIP: ABNORMAL
LYMPHOCYTES # BLD AUTO: 4.1 K/UL (ref 1–4.8)
LYMPHOCYTES NFR BLD: 39.3 % (ref 18–48)
MCH RBC QN AUTO: 33.1 PG (ref 27–31)
MCHC RBC AUTO-ENTMCNC: 34 G/DL (ref 32–36)
MCV RBC AUTO: 97 FL (ref 82–98)
MICROSCOPIC COMMENT: NORMAL
MONOCYTES # BLD AUTO: 0.7 K/UL (ref 0.3–1)
MONOCYTES NFR BLD: 6.8 % (ref 4–15)
NEUTROPHILS # BLD AUTO: 5.3 K/UL (ref 1.8–7.7)
NEUTROPHILS NFR BLD: 50.8 % (ref 38–73)
NITRITE UR QL STRIP: NEGATIVE
NRBC BLD-RTO: 0 /100 WBC
PH UR STRIP: 7 [PH] (ref 5–8)
PLATELET # BLD AUTO: 273 K/UL (ref 150–450)
PMV BLD AUTO: 10.3 FL (ref 9.2–12.9)
POTASSIUM SERPL-SCNC: 4.1 MMOL/L (ref 3.5–5.1)
PROT SERPL-MCNC: 8.1 G/DL (ref 6–8.4)
PROT UR QL STRIP: NEGATIVE
RBC # BLD AUTO: 4.41 M/UL (ref 4–5.4)
RBC #/AREA URNS HPF: 1 /HPF (ref 0–4)
SODIUM SERPL-SCNC: 140 MMOL/L (ref 136–145)
SP GR UR STRIP: <=1.005 (ref 1–1.03)
SQUAMOUS #/AREA URNS HPF: 1 /HPF
URN SPEC COLLECT METH UR: ABNORMAL
UROBILINOGEN UR STRIP-ACNC: NEGATIVE EU/DL
WBC # BLD AUTO: 10.43 K/UL (ref 3.9–12.7)
WBC #/AREA URNS HPF: 1 /HPF (ref 0–5)

## 2022-03-07 PROCEDURE — 87340 HEPATITIS B SURFACE AG IA: CPT | Performed by: INTERNAL MEDICINE

## 2022-03-07 PROCEDURE — 80053 COMPREHEN METABOLIC PANEL: CPT | Performed by: INTERNAL MEDICINE

## 2022-03-07 PROCEDURE — 81000 URINALYSIS NONAUTO W/SCOPE: CPT | Performed by: INTERNAL MEDICINE

## 2022-03-07 PROCEDURE — 86480 TB TEST CELL IMMUN MEASURE: CPT | Performed by: INTERNAL MEDICINE

## 2022-03-07 PROCEDURE — 85025 COMPLETE CBC W/AUTO DIFF WBC: CPT | Performed by: INTERNAL MEDICINE

## 2022-03-07 PROCEDURE — 36415 COLL VENOUS BLD VENIPUNCTURE: CPT | Performed by: INTERNAL MEDICINE

## 2022-03-09 LAB
GAMMA INTERFERON BACKGROUND BLD IA-ACNC: 0.02 IU/ML
M TB IFN-G CD4+ BCKGRND COR BLD-ACNC: 0 IU/ML
M TB IFN-G CD4+CD8+ BCKGRND COR BLD-ACNC: 0 IU/ML
MITOGEN IGNF BCKGRD COR BLD-ACNC: >10 IU/ML
TB GOLD PLUS: NEGATIVE

## 2022-03-10 LAB — HBV SURFACE AG SERPL QL IA: NEGATIVE

## 2022-03-25 ENCOUNTER — LAB VISIT (OUTPATIENT)
Dept: LAB | Facility: HOSPITAL | Age: 57
End: 2022-03-25
Attending: INTERNAL MEDICINE
Payer: MEDICARE

## 2022-03-25 DIAGNOSIS — E87.5 HYPERPOTASSEMIA: Primary | ICD-10-CM

## 2022-03-25 LAB — POTASSIUM SERPL-SCNC: 5 MMOL/L (ref 3.5–5.1)

## 2022-03-25 PROCEDURE — 84132 ASSAY OF SERUM POTASSIUM: CPT | Performed by: INTERNAL MEDICINE

## 2022-03-25 PROCEDURE — 36415 COLL VENOUS BLD VENIPUNCTURE: CPT | Performed by: INTERNAL MEDICINE

## 2022-09-20 ENCOUNTER — LAB VISIT (OUTPATIENT)
Dept: LAB | Facility: HOSPITAL | Age: 57
End: 2022-09-20
Attending: PHYSICIAN ASSISTANT
Payer: MEDICARE

## 2022-09-20 DIAGNOSIS — K50.00 REGIONAL ENTERITIS OF SMALL INTESTINE: Primary | ICD-10-CM

## 2022-09-20 DIAGNOSIS — Z79.899 ENCOUNTER FOR LONG-TERM (CURRENT) USE OF OTHER MEDICATIONS: ICD-10-CM

## 2022-09-20 DIAGNOSIS — D51.8 OTHER VITAMIN B12 DEFICIENCY ANEMIA: ICD-10-CM

## 2022-09-20 LAB
ALBUMIN SERPL BCP-MCNC: 4 G/DL (ref 3.5–5.2)
ALP SERPL-CCNC: 81 U/L (ref 55–135)
ALT SERPL W/O P-5'-P-CCNC: 16 U/L (ref 10–44)
ANION GAP SERPL CALC-SCNC: 6 MMOL/L (ref 8–16)
AST SERPL-CCNC: 12 U/L (ref 10–40)
BASOPHILS # BLD AUTO: 0.05 K/UL (ref 0–0.2)
BASOPHILS NFR BLD: 0.5 % (ref 0–1.9)
BILIRUB SERPL-MCNC: 0.4 MG/DL (ref 0.1–1)
BILIRUB UR QL STRIP: NEGATIVE
BUN SERPL-MCNC: 9 MG/DL (ref 6–20)
CALCIUM SERPL-MCNC: 9.3 MG/DL (ref 8.7–10.5)
CHLORIDE SERPL-SCNC: 107 MMOL/L (ref 95–110)
CLARITY UR: CLEAR
CO2 SERPL-SCNC: 29 MMOL/L (ref 23–29)
COLOR UR: YELLOW
CREAT SERPL-MCNC: 0.9 MG/DL (ref 0.5–1.4)
DIFFERENTIAL METHOD: ABNORMAL
EOSINOPHIL # BLD AUTO: 0.4 K/UL (ref 0–0.5)
EOSINOPHIL NFR BLD: 4.5 % (ref 0–8)
ERYTHROCYTE [DISTWIDTH] IN BLOOD BY AUTOMATED COUNT: 11.4 % (ref 11.5–14.5)
EST. GFR  (NO RACE VARIABLE): >60 ML/MIN/1.73 M^2
GLUCOSE SERPL-MCNC: 94 MG/DL (ref 70–110)
GLUCOSE UR QL STRIP: NEGATIVE
HCT VFR BLD AUTO: 39.9 % (ref 37–48.5)
HGB BLD-MCNC: 13.8 G/DL (ref 12–16)
HGB UR QL STRIP: NEGATIVE
IMM GRANULOCYTES # BLD AUTO: 0.03 K/UL (ref 0–0.04)
IMM GRANULOCYTES NFR BLD AUTO: 0.3 % (ref 0–0.5)
KETONES UR QL STRIP: NEGATIVE
LEUKOCYTE ESTERASE UR QL STRIP: NEGATIVE
LYMPHOCYTES # BLD AUTO: 3.6 K/UL (ref 1–4.8)
LYMPHOCYTES NFR BLD: 37.9 % (ref 18–48)
MCH RBC QN AUTO: 32.4 PG (ref 27–31)
MCHC RBC AUTO-ENTMCNC: 34.6 G/DL (ref 32–36)
MCV RBC AUTO: 94 FL (ref 82–98)
MONOCYTES # BLD AUTO: 0.8 K/UL (ref 0.3–1)
MONOCYTES NFR BLD: 8.2 % (ref 4–15)
NEUTROPHILS # BLD AUTO: 4.5 K/UL (ref 1.8–7.7)
NEUTROPHILS NFR BLD: 48.6 % (ref 38–73)
NITRITE UR QL STRIP: NEGATIVE
NRBC BLD-RTO: 0 /100 WBC
PH UR STRIP: 7 [PH] (ref 5–8)
PLATELET # BLD AUTO: 228 K/UL (ref 150–450)
PMV BLD AUTO: 10.7 FL (ref 9.2–12.9)
POTASSIUM SERPL-SCNC: 4.2 MMOL/L (ref 3.5–5.1)
PROT SERPL-MCNC: 7.5 G/DL (ref 6–8.4)
PROT UR QL STRIP: NEGATIVE
RBC # BLD AUTO: 4.26 M/UL (ref 4–5.4)
SODIUM SERPL-SCNC: 142 MMOL/L (ref 136–145)
SP GR UR STRIP: 1.01 (ref 1–1.03)
URN SPEC COLLECT METH UR: NORMAL
UROBILINOGEN UR STRIP-ACNC: NEGATIVE EU/DL
VIT B12 SERPL-MCNC: 432 PG/ML (ref 210–950)
WBC # BLD AUTO: 9.36 K/UL (ref 3.9–12.7)

## 2022-09-20 PROCEDURE — 85025 COMPLETE CBC W/AUTO DIFF WBC: CPT | Performed by: PHYSICIAN ASSISTANT

## 2022-09-20 PROCEDURE — 81003 URINALYSIS AUTO W/O SCOPE: CPT | Performed by: PHYSICIAN ASSISTANT

## 2022-09-20 PROCEDURE — 36415 COLL VENOUS BLD VENIPUNCTURE: CPT | Performed by: PHYSICIAN ASSISTANT

## 2022-09-20 PROCEDURE — 82607 VITAMIN B-12: CPT | Performed by: PHYSICIAN ASSISTANT

## 2022-09-20 PROCEDURE — 80053 COMPREHEN METABOLIC PANEL: CPT | Performed by: PHYSICIAN ASSISTANT

## 2023-02-03 DIAGNOSIS — Z78.0 OSTEOPENIA AFTER MENOPAUSE: ICD-10-CM

## 2023-02-03 DIAGNOSIS — M85.80 OSTEOPENIA AFTER MENOPAUSE: ICD-10-CM

## 2023-02-03 DIAGNOSIS — Z12.31 ENCOUNTER FOR SCREENING MAMMOGRAM FOR MALIGNANT NEOPLASM OF BREAST: Primary | ICD-10-CM

## 2023-02-06 ENCOUNTER — HOSPITAL ENCOUNTER (OUTPATIENT)
Dept: RADIOLOGY | Facility: HOSPITAL | Age: 58
Discharge: HOME | End: 2023-02-06
Attending: INTERNAL MEDICINE
Payer: MEDICARE

## 2023-02-06 VITALS — BODY MASS INDEX: 25.34 KG/M2 | HEIGHT: 63 IN | WEIGHT: 143 LBS

## 2023-02-06 DIAGNOSIS — Z12.31 ENCOUNTER FOR SCREENING MAMMOGRAM FOR MALIGNANT NEOPLASM OF BREAST: ICD-10-CM

## 2023-02-06 DIAGNOSIS — M85.80 OSTEOPENIA AFTER MENOPAUSE: ICD-10-CM

## 2023-02-06 DIAGNOSIS — Z78.0 OSTEOPENIA AFTER MENOPAUSE: ICD-10-CM

## 2023-02-06 PROCEDURE — 77067 SCR MAMMO BI INCL CAD: CPT | Mod: TC

## 2023-02-06 PROCEDURE — 77080 DXA BONE DENSITY AXIAL: CPT | Mod: TC

## 2023-03-20 ENCOUNTER — LAB VISIT (OUTPATIENT)
Dept: LAB | Facility: HOSPITAL | Age: 58
End: 2023-03-20
Attending: PHYSICIAN ASSISTANT
Payer: MEDICARE

## 2023-03-20 DIAGNOSIS — K50.00 REGIONAL ENTERITIS OF SMALL INTESTINE: Primary | ICD-10-CM

## 2023-03-20 DIAGNOSIS — Z79.899 ENCOUNTER FOR LONG-TERM (CURRENT) USE OF OTHER MEDICATIONS: ICD-10-CM

## 2023-03-20 DIAGNOSIS — D51.8 OTHER VITAMIN B12 DEFICIENCY ANEMIA: ICD-10-CM

## 2023-03-20 PROCEDURE — 80053 COMPREHEN METABOLIC PANEL: CPT | Performed by: PHYSICIAN ASSISTANT

## 2023-03-20 PROCEDURE — 82607 VITAMIN B-12: CPT | Performed by: PHYSICIAN ASSISTANT

## 2023-03-20 PROCEDURE — 86480 TB TEST CELL IMMUN MEASURE: CPT | Performed by: PHYSICIAN ASSISTANT

## 2023-03-20 PROCEDURE — 81003 URINALYSIS AUTO W/O SCOPE: CPT | Performed by: PHYSICIAN ASSISTANT

## 2023-03-20 PROCEDURE — 85025 COMPLETE CBC W/AUTO DIFF WBC: CPT | Performed by: PHYSICIAN ASSISTANT

## 2023-03-20 PROCEDURE — 36415 COLL VENOUS BLD VENIPUNCTURE: CPT | Performed by: PHYSICIAN ASSISTANT

## 2023-03-20 PROCEDURE — 87340 HEPATITIS B SURFACE AG IA: CPT | Performed by: PHYSICIAN ASSISTANT

## 2023-03-21 LAB
ALBUMIN SERPL BCP-MCNC: 3.9 G/DL (ref 3.5–5.2)
ALP SERPL-CCNC: 85 U/L (ref 55–135)
ALT SERPL W/O P-5'-P-CCNC: 25 U/L (ref 10–44)
ANION GAP SERPL CALC-SCNC: 4 MMOL/L (ref 8–16)
AST SERPL-CCNC: 12 U/L (ref 10–40)
BASOPHILS # BLD AUTO: 0.06 K/UL (ref 0–0.2)
BASOPHILS NFR BLD: 0.9 % (ref 0–1.9)
BILIRUB SERPL-MCNC: 0.4 MG/DL (ref 0.1–1)
BILIRUB UR QL STRIP: NEGATIVE
BUN SERPL-MCNC: 18 MG/DL (ref 6–20)
CALCIUM SERPL-MCNC: 8.9 MG/DL (ref 8.7–10.5)
CHLORIDE SERPL-SCNC: 106 MMOL/L (ref 95–110)
CLARITY UR: CLEAR
CO2 SERPL-SCNC: 29 MMOL/L (ref 23–29)
COLOR UR: ABNORMAL
CREAT SERPL-MCNC: 0.9 MG/DL (ref 0.5–1.4)
DIFFERENTIAL METHOD: ABNORMAL
EOSINOPHIL # BLD AUTO: 0.4 K/UL (ref 0–0.5)
EOSINOPHIL NFR BLD: 5.1 % (ref 0–8)
ERYTHROCYTE [DISTWIDTH] IN BLOOD BY AUTOMATED COUNT: 11.5 % (ref 11.5–14.5)
EST. GFR  (NO RACE VARIABLE): >60 ML/MIN/1.73 M^2
GLUCOSE SERPL-MCNC: 113 MG/DL (ref 70–110)
GLUCOSE UR QL STRIP: NEGATIVE
HBV SURFACE AG SERPL QL IA: NORMAL
HCT VFR BLD AUTO: 38 % (ref 37–48.5)
HGB BLD-MCNC: 13.1 G/DL (ref 12–16)
HGB UR QL STRIP: ABNORMAL
IMM GRANULOCYTES # BLD AUTO: 0.01 K/UL (ref 0–0.04)
IMM GRANULOCYTES NFR BLD AUTO: 0.1 % (ref 0–0.5)
KETONES UR QL STRIP: NEGATIVE
LEUKOCYTE ESTERASE UR QL STRIP: NEGATIVE
LYMPHOCYTES # BLD AUTO: 2.3 K/UL (ref 1–4.8)
LYMPHOCYTES NFR BLD: 32.6 % (ref 18–48)
MCH RBC QN AUTO: 33.1 PG (ref 27–31)
MCHC RBC AUTO-ENTMCNC: 34.5 G/DL (ref 32–36)
MCV RBC AUTO: 96 FL (ref 82–98)
MONOCYTES # BLD AUTO: 0.6 K/UL (ref 0.3–1)
MONOCYTES NFR BLD: 8.1 % (ref 4–15)
NEUTROPHILS # BLD AUTO: 3.7 K/UL (ref 1.8–7.7)
NEUTROPHILS NFR BLD: 53.2 % (ref 38–73)
NITRITE UR QL STRIP: NEGATIVE
NRBC BLD-RTO: 0 /100 WBC
PH UR STRIP: 6 [PH] (ref 5–8)
PLATELET # BLD AUTO: 219 K/UL (ref 150–450)
PMV BLD AUTO: 10.9 FL (ref 9.2–12.9)
POTASSIUM SERPL-SCNC: 4 MMOL/L (ref 3.5–5.1)
PROT SERPL-MCNC: 7.2 G/DL (ref 6–8.4)
PROT UR QL STRIP: NEGATIVE
RBC # BLD AUTO: 3.96 M/UL (ref 4–5.4)
SODIUM SERPL-SCNC: 139 MMOL/L (ref 136–145)
SP GR UR STRIP: 1.01 (ref 1–1.03)
URN SPEC COLLECT METH UR: ABNORMAL
UROBILINOGEN UR STRIP-ACNC: NEGATIVE EU/DL
VIT B12 SERPL-MCNC: 348 PG/ML (ref 210–950)
WBC # BLD AUTO: 6.93 K/UL (ref 3.9–12.7)

## 2023-03-22 LAB
GAMMA INTERFERON BACKGROUND BLD IA-ACNC: 0 IU/ML
M TB IFN-G CD4+ BCKGRND COR BLD-ACNC: 0 IU/ML
MITOGEN IGNF BCKGRD COR BLD-ACNC: 10 IU/ML
TB GOLD PLUS: NEGATIVE
TB2 - NIL: 0 IU/ML

## 2024-01-24 DIAGNOSIS — Z12.31 ENCOUNTER FOR SCREENING MAMMOGRAM FOR MALIGNANT NEOPLASM OF BREAST: Primary | ICD-10-CM

## 2024-02-23 ENCOUNTER — HOSPITAL ENCOUNTER (OUTPATIENT)
Dept: RADIOLOGY | Facility: HOSPITAL | Age: 59
Discharge: HOME | End: 2024-02-23
Attending: INTERNAL MEDICINE
Payer: MEDICARE

## 2024-02-23 VITALS — BODY MASS INDEX: 25.61 KG/M2 | WEIGHT: 150 LBS | HEIGHT: 64 IN

## 2024-02-23 DIAGNOSIS — Z12.31 ENCOUNTER FOR SCREENING MAMMOGRAM FOR MALIGNANT NEOPLASM OF BREAST: ICD-10-CM

## 2024-02-23 PROCEDURE — 77067 SCR MAMMO BI INCL CAD: CPT | Mod: TC

## 2024-05-21 DIAGNOSIS — N63.0 SUBCUTANEOUS NODULE OF BREAST: Primary | ICD-10-CM

## 2024-06-04 ENCOUNTER — HOSPITAL ENCOUNTER (OUTPATIENT)
Dept: RADIOLOGY | Facility: HOSPITAL | Age: 59
Discharge: HOME | End: 2024-06-04
Attending: INTERNAL MEDICINE
Payer: MEDICARE

## 2024-06-04 VITALS — HEIGHT: 64 IN | WEIGHT: 150 LBS | BODY MASS INDEX: 25.61 KG/M2

## 2024-06-04 DIAGNOSIS — N63.20 LEFT BREAST MASS: ICD-10-CM

## 2024-06-04 PROCEDURE — 77061 BREAST TOMOSYNTHESIS UNI: CPT | Mod: TC,LT

## 2024-06-04 PROCEDURE — 76642 ULTRASOUND BREAST LIMITED: CPT | Mod: TC,LT

## 2024-06-04 PROCEDURE — 77065 DX MAMMO INCL CAD UNI: CPT | Mod: TC,LT

## 2025-03-14 ENCOUNTER — HOSPITAL ENCOUNTER (OUTPATIENT)
Dept: RADIOLOGY | Facility: HOSPITAL | Age: 60
Discharge: HOME | End: 2025-03-14
Payer: MEDICARE

## 2025-03-14 VITALS — HEIGHT: 64 IN | WEIGHT: 150 LBS | BODY MASS INDEX: 25.61 KG/M2

## 2025-03-14 DIAGNOSIS — Z12.31 ENCOUNTER FOR SCREENING MAMMOGRAM FOR MALIGNANT NEOPLASM OF BREAST: ICD-10-CM

## 2025-03-14 PROCEDURE — 77063 BREAST TOMOSYNTHESIS BI: CPT | Mod: TC

## 2025-08-04 ENCOUNTER — HOSPITAL ENCOUNTER (OUTPATIENT)
Dept: RADIOLOGY | Facility: HOSPITAL | Age: 60
Discharge: HOME | End: 2025-08-04
Attending: INTERNAL MEDICINE
Payer: MEDICARE

## 2025-08-04 ENCOUNTER — LAB VISIT (OUTPATIENT)
Dept: LAB | Facility: HOSPITAL | Age: 60
End: 2025-08-04
Attending: INTERNAL MEDICINE
Payer: MEDICARE

## 2025-08-04 DIAGNOSIS — R60.0 BILATERAL LEG EDEMA: Primary | ICD-10-CM

## 2025-08-04 DIAGNOSIS — R60.0 BILATERAL LEG EDEMA: ICD-10-CM

## 2025-08-04 LAB
D DIMER PPP IA.FEU-MCNC: 0.23 MG/L FEU
NT-PROBNP SERPL-MCNC: 85 PG/ML

## 2025-08-04 PROCEDURE — 36415 COLL VENOUS BLD VENIPUNCTURE: CPT

## 2025-08-04 PROCEDURE — 83880 ASSAY OF NATRIURETIC PEPTIDE: CPT

## 2025-08-04 PROCEDURE — 71046 X-RAY EXAM CHEST 2 VIEWS: CPT | Mod: TC

## 2025-08-04 PROCEDURE — 85379 FIBRIN DEGRADATION QUANT: CPT

## (undated) DEVICE — BANDAID STRIP PLASTIC 3/4X3

## (undated) DEVICE — BANDAGE ADHESIVE